# Patient Record
Sex: MALE | Race: WHITE | NOT HISPANIC OR LATINO | Employment: OTHER | URBAN - METROPOLITAN AREA
[De-identification: names, ages, dates, MRNs, and addresses within clinical notes are randomized per-mention and may not be internally consistent; named-entity substitution may affect disease eponyms.]

---

## 2017-03-07 ENCOUNTER — OFFICE VISIT (OUTPATIENT)
Dept: OPHTHALMOLOGY | Facility: CLINIC | Age: 72
End: 2017-03-07
Payer: MEDICARE

## 2017-03-07 DIAGNOSIS — H25.12 NS (NUCLEAR SCLEROSIS), LEFT: ICD-10-CM

## 2017-03-07 DIAGNOSIS — H40.10X3 ADVANCED OPEN-ANGLE GLAUCOMA, SEVERE STAGE: Primary | ICD-10-CM

## 2017-03-07 DIAGNOSIS — H25.11 NS (NUCLEAR SCLEROSIS), RIGHT: ICD-10-CM

## 2017-03-07 PROCEDURE — 92012 INTRM OPH EXAM EST PATIENT: CPT | Mod: S$GLB,,, | Performed by: OPHTHALMOLOGY

## 2017-03-07 PROCEDURE — 99999 PR PBB SHADOW E&M-EST. PATIENT-LVL I: CPT | Mod: PBBFAC,,, | Performed by: OPHTHALMOLOGY

## 2017-03-07 NOTE — PROGRESS NOTES
HPI     Stroke 6/2/2016  +GLAUCOMA X MANY YEARS  MINISHUNT OS BY [employer]GM  +DM X 13 YEARS  CATS OU              BRIMONIDINE TID OU  DORZ-ROSI BID OU  LATANOPROST QHS OU  Desires to have cataract removed - trouble reading.        Last edited by Saurav Kauffman MD on 3/7/2017  8:20 AM.         Assessment /Plan     For exam results, see Encounter Report.      ICD-10-CM ICD-9-CM    1. Advanced open-angle glaucoma, severe stage H40.10X3 365.10 iop controlled at present     365.73    2. NS (nuclear sclerosis), left H25.12 366.16 Desires phaco   3. NS (nuclear sclerosis), right H25.11 366.16        BRIMONIDINE TID OU  DORZ-ROSI BID OU  LATANOPROST QHS OU  Desires to have cataract removed - trouble reading.   Return to clinic 3 months for dilation, Ascan, Sdp  Plan for Phaco/KDB OS

## 2017-05-17 RX ORDER — PEN NEEDLE, DIABETIC 31 GX5/16"
NEEDLE, DISPOSABLE MISCELLANEOUS
Qty: 100 EACH | Refills: 3 | Status: SHIPPED | OUTPATIENT
Start: 2017-05-17 | End: 2018-11-01 | Stop reason: SDUPTHER

## 2017-05-24 ENCOUNTER — OFFICE VISIT (OUTPATIENT)
Dept: OPHTHALMOLOGY | Facility: CLINIC | Age: 72
End: 2017-05-24
Payer: MEDICARE

## 2017-05-24 DIAGNOSIS — H40.1133 PRIMARY OPEN ANGLE GLAUCOMA OF BOTH EYES, SEVERE STAGE: Primary | ICD-10-CM

## 2017-05-24 DIAGNOSIS — H25.12 NS (NUCLEAR SCLEROSIS), LEFT: ICD-10-CM

## 2017-05-24 DIAGNOSIS — H25.11 NS (NUCLEAR SCLEROSIS), RIGHT: ICD-10-CM

## 2017-05-24 PROCEDURE — 92014 COMPRE OPH EXAM EST PT 1/>: CPT | Mod: S$GLB,,, | Performed by: OPHTHALMOLOGY

## 2017-05-24 PROCEDURE — 99999 PR PBB SHADOW E&M-EST. PATIENT-LVL I: CPT | Mod: PBBFAC,,, | Performed by: OPHTHALMOLOGY

## 2017-05-24 NOTE — PROGRESS NOTES
HPI     Glaucoma    Additional comments: BRIM TID OU, COSOPT BID OU  LATANOPROST QHS OU           Comments   Patient is here for a 3 month dilation with cataract check, patient denies   any vision changes since last visit. Left eye has trouble with reading.     Stroke 6/2/2016  +GLAUCOMA X MANY YEARS  MINISHUNT OS BY MGM  +DM X 13 YEARS  CATS OU      OU BRIMONIDINE TID ,DORZ-ROSI BID , LATANOPROST QHS            Last edited by Saurav Kauffman MD on 5/24/2017  4:06 PM. (History)            Assessment /Plan     For exam results, see Encounter Report.      ICD-10-CM ICD-9-CM    1. Primary open angle glaucoma of both eyes, severe stage H40.1133 365.11 iop controlled at present.      365.73    2. NS (nuclear sclerosis), left H25.12 366.16 Very eymptomatic and needs surgery. Patient desires to wait until July.   Cataract od developing as well.    3. Uncontrolled type 2 diabetes mellitus with stage 3 chronic kidney disease, with long-term current use of insulin E11.22 250.52 Diabetes with no diabetic retinopathy on dilated exam.   Reviewed diabetic eye precautions including excellent blood sugar control, and importance of regular follow up.       E11.65 585.3     N18.3 V58.67     Z79.4         Continue BRIM TID OU, COSOPT BID OU  LATANOPROST QHS OU   Return to clinic for cataract preop OS. Would prefer to block.  Med and Ascan not sent today.

## 2017-06-05 ENCOUNTER — TELEPHONE (OUTPATIENT)
Dept: OPHTHALMOLOGY | Facility: CLINIC | Age: 72
End: 2017-06-05

## 2017-06-05 RX ORDER — LATANOPROST 50 UG/ML
1 SOLUTION/ DROPS OPHTHALMIC NIGHTLY
Qty: 1 BOTTLE | Refills: 0 | Status: SHIPPED | OUTPATIENT
Start: 2017-06-05 | End: 2018-04-30 | Stop reason: SDUPTHER

## 2017-06-05 NOTE — TELEPHONE ENCOUNTER
----- Message from Shin Golden sent at 6/5/2017  1:18 PM CDT -----  Pt is requesting a call from nurse to f/u on a prescription for eye drops.          Please call pt back at [phone]

## 2017-06-05 NOTE — TELEPHONE ENCOUNTER
----- Message from Pat Adryan sent at 6/5/2017 11:42 AM CDT -----  Contact: pt  Pt calling to speak to nurse...states that he left his eye drops that have to be refrigerated at home and [place]....wants to know if can get 1 bottle of drops rx sent to pharmacy where he is currently located..    [facility] in [place], [place]    States that sent previous message for rx request but gave the wrong pharmacy...please adv/call pt back at [phone]//thx jw

## 2017-07-18 ENCOUNTER — OFFICE VISIT (OUTPATIENT)
Dept: OPHTHALMOLOGY | Facility: CLINIC | Age: 72
End: 2017-07-18
Payer: MEDICARE

## 2017-07-18 DIAGNOSIS — H40.1133 PRIMARY OPEN ANGLE GLAUCOMA OF BOTH EYES, SEVERE STAGE: ICD-10-CM

## 2017-07-18 DIAGNOSIS — H25.042 POSTERIOR SUBCAPSULAR POLAR SENILE CATARACT, LEFT: Primary | ICD-10-CM

## 2017-07-18 DIAGNOSIS — H25.11 NS (NUCLEAR SCLEROSIS), RIGHT: ICD-10-CM

## 2017-07-18 PROCEDURE — 92012 INTRM OPH EXAM EST PATIENT: CPT | Mod: S$GLB,,, | Performed by: OPHTHALMOLOGY

## 2017-07-18 PROCEDURE — 99999 PR PBB SHADOW E&M-EST. PATIENT-LVL II: CPT | Mod: PBBFAC,,, | Performed by: OPHTHALMOLOGY

## 2017-07-18 PROCEDURE — 92136 OPHTHALMIC BIOMETRY: CPT | Mod: LT,S$GLB,, | Performed by: OPHTHALMOLOGY

## 2017-07-18 RX ORDER — AMLODIPINE BESYLATE 5 MG/1
5 TABLET ORAL DAILY
COMMUNITY

## 2017-07-18 RX ORDER — DIFLUPREDNATE OPHTHALMIC 0.5 MG/ML
1 EMULSION OPHTHALMIC 4 TIMES DAILY
Qty: 1 BOTTLE | Refills: 0 | Status: SHIPPED | OUTPATIENT
Start: 2017-07-18 | End: 2017-08-21 | Stop reason: SDUPTHER

## 2017-07-18 RX ORDER — POLYMYXIN B SULFATE AND TRIMETHOPRIM 1; 10000 MG/ML; [USP'U]/ML
1 SOLUTION OPHTHALMIC 4 TIMES DAILY
Qty: 1 BOTTLE | Refills: 1 | Status: SHIPPED | OUTPATIENT
Start: 2017-07-18 | End: 2017-07-28

## 2017-07-18 NOTE — PROGRESS NOTES
HPI     Patient is here for preop Phaco os , wants surgery as son as possible,   patient takes a 81 mg. Aspirin daily.      Stroke 6/2/2016  +GLAUCOMA X MANY YEARS  MINISHUNT OS BY MGM  +DM X 13 YEARS  CATS OU      OU BRIMONIDINE TID ,DORZ-ROSI BID , LATANOPROST QHS              Last edited by CLAIRE Hackett on 7/18/2017  9:43 AM. (History)            Assessment /Plan     For exam results, see Encounter Report.      ICD-10-CM ICD-9-CM    1. Posterior subcapsular polar senile cataract, left H25.042 366.14 Ambulatory Referral to External Surgery      IOL Master - OS - Left Eye      polymyxin B sulf-trimethoprim (POLYTRIM) 10,000 unit- 1 mg/mL Drop      nepafenac (ILEVRO) 0.3 % DrpS      difluprednate (DUREZOL) 0.05 % Drop ophthalmic solution    Visually Significant Cataract OS > OD  Patient reports decreased vision consistent with the clinical amount of lenticular opacity, which reaches the level of visual significance and affects activities of daily living including reading and glare. Risks, benefits, and alternatives to cataract surgery were discussed.   The pt expressed a desire to proceed with surgery with the potential for some reasonable degree of visual improvement.    Discussed IOL options and refractive outcomes for this patient.    Phaco left eye,   Block  monofocal IOL.  Will aim for distance  Referral to [facility] for [facility]hthalmic surgery  Prescriptions sent for preoperative medications  Durezol, Polytrim, and Ilevro  Explained that patient may need glasses after surgery.  Discussed that vision may be limited by diabetes and glaucoma      18.5. Stop aspirin          2. Primary open angle glaucoma of both eyes, severe stage H40.1133 365.11 Doing well - intraocular pressure is within acceptable range relative to target pressure with no evidence of progression.   Continue current treatment.  Reviewed importance of continued compliance with treatment and follow up.        365.73    3. NS  (nuclear sclerosis), right H25.11 366.16        Return to clinic for cataract surgery OS

## 2017-07-28 ENCOUNTER — OFFICE VISIT (OUTPATIENT)
Dept: OPHTHALMOLOGY | Facility: CLINIC | Age: 72
End: 2017-07-28
Payer: MEDICARE

## 2017-07-28 DIAGNOSIS — Z98.890 POST-OPERATIVE STATE: Primary | ICD-10-CM

## 2017-07-28 DIAGNOSIS — H40.1133 PRIMARY OPEN ANGLE GLAUCOMA OF BOTH EYES, SEVERE STAGE: ICD-10-CM

## 2017-07-28 DIAGNOSIS — Z98.42 CATARACT EXTRACTION STATUS, LEFT: ICD-10-CM

## 2017-07-28 PROCEDURE — 99024 POSTOP FOLLOW-UP VISIT: CPT | Mod: S$GLB,,, | Performed by: OPHTHALMOLOGY

## 2017-07-28 PROCEDURE — 99999 PR PBB SHADOW E&M-EST. PATIENT-LVL I: CPT | Mod: PBBFAC,,, | Performed by: OPHTHALMOLOGY

## 2017-07-28 NOTE — PROGRESS NOTES
HPI     Post-op Evaluation    Additional comments: 1 day phaco os. 2 on pain scale today. doing drops   as directed           Glaucoma    Additional comments: brimonidine tid ou, cosopt bid ou, latanoprost qhs   ou           Comments   Stroke 6/2/2016  +GLAUCOMA X MANY YEARS  MINISHUNT OS BY MGM  +DM X 13 YEARS  CATS OD  PCIOL OS      OU BRIMONIDINE TID ,DORZ-ROSI BID , LATANOPROST QHS  Durezol qid os, polytrim qid os, ilevro qd os       Last edited by Pat Crooks MA on 7/28/2017  9:04 AM. (History)            Assessment /Plan     For exam results, see Encounter Report.      ICD-10-CM ICD-9-CM    1. Post-operative state Z98.890 V45.89    2. Cataract extraction status, left Z98.42 V45.61    3. Primary open angle glaucoma of both eyes, severe stage H40.1133 365.11      365.73        PO Day 1 S/P Phaco/IOL  leftt eye  Doing well.    Use Durezol QID  Ilevro daily  Polymyxin B 4 x day  Reinstructed in importance of absolute compliance with Post-OP instructions including medications, shield at bedtime, and limitation of activities. Follow up appointments in approximately one and six weeks or call immediately for increased pain, redness or vision loss.     Resume coag meds to surgical eye   OU BRIMONIDINE TID ,DORZ-ROSI BID , LATANOPROST QHS

## 2017-07-31 ENCOUNTER — OFFICE VISIT (OUTPATIENT)
Dept: OPHTHALMOLOGY | Facility: CLINIC | Age: 72
End: 2017-07-31
Payer: MEDICARE

## 2017-07-31 DIAGNOSIS — H25.11 NS (NUCLEAR SCLEROSIS), RIGHT: ICD-10-CM

## 2017-07-31 DIAGNOSIS — H40.1133 PRIMARY OPEN ANGLE GLAUCOMA OF BOTH EYES, SEVERE STAGE: ICD-10-CM

## 2017-07-31 DIAGNOSIS — Z98.890 POST-OPERATIVE STATE: Primary | ICD-10-CM

## 2017-07-31 DIAGNOSIS — Z98.42 CATARACT EXTRACTION STATUS, LEFT: ICD-10-CM

## 2017-07-31 PROCEDURE — 99024 POSTOP FOLLOW-UP VISIT: CPT | Mod: S$GLB,,, | Performed by: OPHTHALMOLOGY

## 2017-07-31 PROCEDURE — 99999 PR PBB SHADOW E&M-EST. PATIENT-LVL II: CPT | Mod: PBBFAC,,, | Performed by: OPHTHALMOLOGY

## 2017-07-31 PROCEDURE — 92136 OPHTHALMIC BIOMETRY: CPT | Mod: 26,RT,S$GLB, | Performed by: OPHTHALMOLOGY

## 2017-07-31 RX ORDER — POLYMYXIN B SULFATE AND TRIMETHOPRIM 1; 10000 MG/ML; [USP'U]/ML
1 SOLUTION OPHTHALMIC EVERY 4 HOURS
Qty: 1 BOTTLE | Refills: 1 | Status: CANCELLED | OUTPATIENT
Start: 2017-07-31 | End: 2017-08-10

## 2017-07-31 RX ORDER — DIFLUPREDNATE OPHTHALMIC 0.5 MG/ML
1 EMULSION OPHTHALMIC 4 TIMES DAILY
Qty: 1 BOTTLE | Refills: 1 | Status: CANCELLED | OUTPATIENT
Start: 2017-07-31 | End: 2017-08-30

## 2017-07-31 NOTE — PROGRESS NOTES
HPI     Post-op Evaluation    Additional comments: OS; Polytrim, Durezol QID, Ilvero QD           Glaucoma    Additional comments: OU: Cosopt BID, Brimodine TID, Latanoprost QHS           Comments   5 day PO PCIOL OS  Scratchy, no pain  VA improving OS, VA stable OD    Stroke 6/2/2016  +GLAUCOMA X MANY YEARS  MINISHUNT OS BY MGM  +DM X 13 YEARS  CATS OD  PCIOL OS +18.5 SN60WF / CDE: 17.58 / 7/27/2017      OU BRIMONIDINE TID ,DORZ-ROSI BID , LATANOPROST QHS  Durezol qid os, polytrim qid os, ilevro qd os       Last edited by Juju Moran on 7/31/2017  9:15 AM. (History)            Assessment /Plan     For exam results, see Encounter Report.      ICD-10-CM ICD-9-CM    1. Post-operative state Z98.890 V45.89 PO Week 1 S/P Phaco/ IOL left eye:   Doing well with no evidence of infection or abnormal inflammation.     D/C antibiotic drops  Taper Durezol weekly per instruction sheet.  Ilevro daily 7 more days.  Resume normal activitites and d/c eye shield.  OTC reading glasses can be used until evaluated for final MR.  D/c Shield at night       2. Cataract extraction status, left Z98.42 V45.61    3. NS (nuclear sclerosis), right H25.11 366.16 Patient reports decreased vision in the fellow eye consistent with the clinical amount of lenticular opacity, which reaches the level of visual significance and affects activities of daily living including reading and glare. Risks, benefits, and alternatives to cataract surgery were discussed and pt desired to schedule cataract surgery. Pt was consented and the biometry and lens options were reviewed.    Phaco right for 8/10   Pt wants early as possible arrival   Pt will Stop ASA today   Block   Requests a monofocal  IOL.  Will aim for distance  Patient given prescriptions for Polytrim, Durezol, and Ilevro  Explained that patient may need glasses after surgery.  Discussed that vision may be limited by dm/ glaucoma   IOL Master - MOD 26 - OD- Right eye      Ambulatory Referral to External  Surgery      nepafenac (ILEVRO) 0.3 % DrpS      polymyxin B sulf-trimethoprim (POLYTRIM) 10,000 unit- 1 mg/mL Drop      difluprednate (DUREZOL) 0.05 % Drop ophthalmic solution   4. Primary open angle glaucoma of both eyes, severe stage H40.1133 365.11      365.73          OU: Cosopt BID, Brimodine TID, Latanoprost QHS

## 2017-08-11 ENCOUNTER — OFFICE VISIT (OUTPATIENT)
Dept: OPHTHALMOLOGY | Facility: CLINIC | Age: 72
End: 2017-08-11
Payer: MEDICARE

## 2017-08-11 DIAGNOSIS — Z98.41 CATARACT EXTRACTION STATUS, RIGHT: ICD-10-CM

## 2017-08-11 DIAGNOSIS — Z98.890 POST-OPERATIVE STATE: Primary | ICD-10-CM

## 2017-08-11 PROCEDURE — 99024 POSTOP FOLLOW-UP VISIT: CPT | Mod: S$GLB,,, | Performed by: OPHTHALMOLOGY

## 2017-08-11 PROCEDURE — 99999 PR PBB SHADOW E&M-EST. PATIENT-LVL I: CPT | Mod: PBBFAC,,, | Performed by: OPHTHALMOLOGY

## 2017-08-11 NOTE — PROGRESS NOTES
HPI     Post-op Evaluation    Additional comments: OD: Durezol QID, Polytrim QID, Ilevro QD           Glaucoma    Additional comments: OU BRIMONIDINE TID ,DORZ-ROSI BID , LATANOPROST QHS           Comments   Pt here for 1 day PCIOL OD PO. Pt states that yesterday, his right eye   experienced severe pain and scratchiness. Today, he says that his eye was   seeing lightning, it was only one episode and a quick light, not happened since . The lightning is not there anymore. It was just for a   small instance. He says that he still feels pain in it, but it is a little   better as he only feels it when he blinks. 99% compliant with gtts.     Stroke 6/2/2016  +GLAUCOMA X MANY YEARS  MINISHUNT OS BY MGM  +DM X 13 YEARS  PCIOL OD 8-10-17  PCIOL OS +18.5 SN60WF / CDE: 17.58 / 7/27/2017      OU BRIMONIDINE TID ,DORZ-ROSI BID , LATANOPROST QHS  Durezol Tid os, Ilevro QD  OD: Durezol QID, Polytrim QID, Ilevro QD       Last edited by Wong Gr, Patient Care Assistant on 8/11/2017  9:15   AM. (History)            Assessment /Plan     For exam results, see Encounter Report.      ICD-10-CM ICD-9-CM    1. Post-operative state Z98.890 V45.89    2. Cataract extraction status, right Z98.41 V45.61        PO Day 1 S/P Phaco/IOL  right eye  Doing well.    Use Durezol QID  Ilevro daily  Polymyxin B 4 x day  Reinstructed in importance of absolute compliance with Post-OP instructions including medications, shield at bedtime, and limitation of activities. Follow up appointments in approximately one and six weeks or call immediately for increased pain, redness or vision loss.           : OU BRIMONIDINE TID ,DORZ-ROSI BID , LATANOPROST QHS

## 2017-08-16 ENCOUNTER — OFFICE VISIT (OUTPATIENT)
Dept: OPHTHALMOLOGY | Facility: CLINIC | Age: 72
End: 2017-08-16
Payer: MEDICARE

## 2017-08-16 DIAGNOSIS — Z98.42 CATARACT EXTRACTION STATUS, LEFT: ICD-10-CM

## 2017-08-16 DIAGNOSIS — Z98.890 POST-OPERATIVE STATE: Primary | ICD-10-CM

## 2017-08-16 DIAGNOSIS — Z98.41 CATARACT EXTRACTION STATUS, RIGHT: ICD-10-CM

## 2017-08-16 PROCEDURE — 99024 POSTOP FOLLOW-UP VISIT: CPT | Mod: S$GLB,,, | Performed by: OPHTHALMOLOGY

## 2017-08-16 PROCEDURE — 99999 PR PBB SHADOW E&M-EST. PATIENT-LVL I: CPT | Mod: PBBFAC,,, | Performed by: OPHTHALMOLOGY

## 2017-08-16 NOTE — PROGRESS NOTES
HPI     Sp PCIOL OD +19.5 SN60WF / CDE: 15:15 / 8-10-17    Stroke 6/2/2016  +GLAUCOMA X MANY YEARS  MINISHUNT OS BY MGM  +DM X 13 YEARS  PCIOL OD +19.5 SN60WF / CDE: 15:15 / 8-10-17  PCIOL OS +18.5 SN60WF / CDE: 17.58 / 7/27/2017      OU BRIMONIDINE TID ,DORZ-ROSI BID , LATANOPROST QHS  Durezol bid os  OD: Durezol QID, Polytrim QID, Ilevro QD  Systane prn    Last edited by Mary Jones on 8/16/2017  2:42 PM. (History)            Assessment /Plan     For exam results, see Encounter Report.      ICD-10-CM ICD-9-CM    1. Post-operative state Z98.890 V45.89    2. Cataract extraction status, right Z98.41 V45.61    3. Cataract extraction status, left Z98.42 V45.61        PO Week 1 S/P Phaco/ IOL right eye:   Doing well with no evidence of infection or abnormal inflammation.     D/C antibiotic drops  Taper Durezol weekly per instruction sheet.  Ilevro daily until out   Resume normal activitites and d/c eye shield.  OTC reading glasses can be used until evaluated for final MR.  D/c Shield at night    RTC 4 weeks or PRN pain, redness, vision loss, or other concerns.       OU BRIMONIDINE TID ,DORZ-ROSI BID , LATANOPROST QHS

## 2017-08-21 DIAGNOSIS — H25.042 POSTERIOR SUBCAPSULAR POLAR SENILE CATARACT, LEFT: ICD-10-CM

## 2017-08-22 RX ORDER — DUREZOL 0.5 MG/ML
EMULSION OPHTHALMIC
Qty: 5 ML | Refills: 1 | Status: SHIPPED | OUTPATIENT
Start: 2017-08-22 | End: 2018-01-24

## 2017-09-13 ENCOUNTER — OFFICE VISIT (OUTPATIENT)
Dept: OPHTHALMOLOGY | Facility: CLINIC | Age: 72
End: 2017-09-13
Payer: MEDICARE

## 2017-09-13 DIAGNOSIS — Z98.890 POST-OPERATIVE STATE: Primary | ICD-10-CM

## 2017-09-13 PROCEDURE — 99024 POSTOP FOLLOW-UP VISIT: CPT | Mod: S$GLB,,, | Performed by: OPHTHALMOLOGY

## 2017-09-13 PROCEDURE — 99999 PR PBB SHADOW E&M-EST. PATIENT-LVL I: CPT | Mod: PBBFAC,,, | Performed by: OPHTHALMOLOGY

## 2017-09-13 NOTE — PROGRESS NOTES
HPI     PCIOL OD +19.5 SN60WF / CDE: 15:15 / 8-10-17,   PCIOL OS +18.5 SN60WF / CDE: 17.58 / 7/27/2017.     No complaints, no eye pain.    Stroke 6/2/2016  +GLAUCOMA X MANY YEARS  MINISHUNT OS BY MGM  +DM X 13 YEARS  PCIOL OD +19.5 SN60WF / CDE: 15:15 / 8-10-17  PCIOL OS +18.5 SN60WF / CDE: 17.58 / 7/27/2017      OU BRIMONIDINE TID ,DORZ-ROSI BID , LATANOPROST QHS    Last edited by Saurav Kauffman MD on 9/13/2017  1:23 PM. (History)            Assessment /Plan     For exam results, see Encounter Report.      ICD-10-CM ICD-9-CM    1. Post-operative state Z98.890 V45.89        Uveitis present OD- resume Durezol QD OU  until next visit     RETURN TO CLINIC 4-5 weeks     [facility] wants [facility] to 2% brimonidine- that is okay but TID     OU BRIMONIDINE TID ,DORZ-ROSI BID , LATANOPROST QHS

## 2017-10-18 ENCOUNTER — OFFICE VISIT (OUTPATIENT)
Dept: OPHTHALMOLOGY | Facility: CLINIC | Age: 72
End: 2017-10-18
Payer: MEDICARE

## 2017-10-18 DIAGNOSIS — Z98.42 CATARACT EXTRACTION STATUS, LEFT: ICD-10-CM

## 2017-10-18 DIAGNOSIS — Z98.41 CATARACT EXTRACTION STATUS, RIGHT: ICD-10-CM

## 2017-10-18 DIAGNOSIS — H40.1133 PRIMARY OPEN ANGLE GLAUCOMA OF BOTH EYES, SEVERE STAGE: Primary | ICD-10-CM

## 2017-10-18 PROCEDURE — 99024 POSTOP FOLLOW-UP VISIT: CPT | Mod: S$GLB,,, | Performed by: OPHTHALMOLOGY

## 2017-10-18 PROCEDURE — 99999 PR PBB SHADOW E&M-EST. PATIENT-LVL I: CPT | Mod: PBBFAC,,, | Performed by: OPHTHALMOLOGY

## 2017-10-18 NOTE — PROGRESS NOTES
HPI     Patient returns for a one month post op visit., patient states he ran out   of Durezol, 3 weeks ago.        Stroke 6/2/2016  +GLAUCOMA X MANY YEARS  MINISHUNT OS BY MGM  +DM X 13 YEARS  PCIOL OD +19.5 SN60WF / CDE: 15:15 / 8-10-17  PCIOL OS +18.5 SN60WF / CDE: 17.58 / 7/27/2017    OD DUREZOL ONCE DAILY (RAN OUT 3 WEEKS AGO)  OU BRIMONIDINE TID ,DORZ-ROSI BID , LATANOPROST QHS    Last edited by CLAIRE Hackett on 10/18/2017  2:13 PM. (History)            Assessment /Plan     For exam results, see Encounter Report.      ICD-10-CM ICD-9-CM    1. Primary open angle glaucoma of both eyes, severe stage H40.1133 365.11 iop stable- Follow      365.73    2. Cataract extraction status, left Z98.42 V45.61 S/p phaco- doing well    3. Cataract extraction status, right Z98.41 V45.61 S/p phaco- doing well        RETURN TO CLINIC 3-4 month HVF, SDP and DOA     OU BRIMONIDINE TID ,DORZ-ROSI BID , LATANOPROST QHS

## 2018-01-24 ENCOUNTER — OFFICE VISIT (OUTPATIENT)
Dept: OPHTHALMOLOGY | Facility: CLINIC | Age: 73
End: 2018-01-24
Payer: MEDICARE

## 2018-01-24 DIAGNOSIS — Z98.42 CATARACT EXTRACTION STATUS, LEFT: ICD-10-CM

## 2018-01-24 DIAGNOSIS — Z98.41 CATARACT EXTRACTION STATUS, RIGHT: ICD-10-CM

## 2018-01-24 DIAGNOSIS — H40.1133 PRIMARY OPEN ANGLE GLAUCOMA OF BOTH EYES, SEVERE STAGE: Primary | ICD-10-CM

## 2018-01-24 PROCEDURE — 92014 COMPRE OPH EXAM EST PT 1/>: CPT | Mod: S$GLB,,, | Performed by: OPHTHALMOLOGY

## 2018-01-24 PROCEDURE — 92083 EXTENDED VISUAL FIELD XM: CPT | Mod: S$GLB,,, | Performed by: OPHTHALMOLOGY

## 2018-01-24 PROCEDURE — 99999 PR PBB SHADOW E&M-EST. PATIENT-LVL I: CPT | Mod: PBBFAC,,, | Performed by: OPHTHALMOLOGY

## 2018-01-24 PROCEDURE — 92250 FUNDUS PHOTOGRAPHY W/I&R: CPT | Mod: S$GLB,,, | Performed by: OPHTHALMOLOGY

## 2018-01-24 NOTE — PROGRESS NOTES
HPI     Here for IOP check, HVF review and FD with SDPs.  No changes in vision   per pt.    Stroke 6/2/2016  +GLAUCOMA X MANY YEARS  MINISHUNT OS BY MGM  +DM X 13 YEARS  PCIOL OD +19.5 SN60WF / CDE: 15:15 / 8-10-17  PCIOL OS +18.5 SN60WF / CDE: 17.58 / 7/27/2017    OU BRIMONIDINE TID ,DORZ-ROSI BID , LATANOPROST QHS    Last edited by Mary Jones on 1/24/2018  2:59 PM. (History)            Assessment /Plan     For exam results, see Encounter Report.      ICD-10-CM ICD-9-CM    1. Primary open angle glaucoma of both eyes, severe stage H40.1133 365.11 Doing well - intraocular pressure is within acceptable range relative to target pressure with no evidence of progression.   Continue current treatment.  Reviewed importance of continued compliance with treatment and follow up.     Color Fundus Photography - OU - Both Eyes     365.73 [place] Visual Field - OU - Extended - Both Eyes   2. Uncontrolled type 2 diabetes mellitus with stage 3 chronic kidney disease, with long-term current use of insulin E11.22 250.52 Diabetes with no diabetic retinopathy on dilated exam.   Reviewed diabetic eye precautions including excellent blood sugar control, and importance of regular follow up.       E11.65 585.3     N18.3 V58.67     Z79.4     3. Cataract extraction status, left Z98.42 V45.61    4. Cataract extraction status, right Z98.41 V45.61        OU BRIMONIDINE TID ,DORZ-ROSI BID , LATANOPROST QHS   Return to clinic 4 months for iop

## 2018-04-30 ENCOUNTER — TELEPHONE (OUTPATIENT)
Dept: OPHTHALMOLOGY | Facility: CLINIC | Age: 73
End: 2018-04-30

## 2018-04-30 RX ORDER — LATANOPROST 50 UG/ML
1 SOLUTION/ DROPS OPHTHALMIC NIGHTLY
Qty: 1 BOTTLE | Refills: 6 | Status: SHIPPED | OUTPATIENT
Start: 2018-04-30 | End: 2018-05-29 | Stop reason: SDUPTHER

## 2018-04-30 RX ORDER — BRIMONIDINE TARTRATE 1 MG/ML
1 SOLUTION/ DROPS OPHTHALMIC 3 TIMES DAILY
Qty: 10 ML | Refills: 6 | Status: SHIPPED | OUTPATIENT
Start: 2018-04-30 | End: 2018-05-29 | Stop reason: SDUPTHER

## 2018-04-30 RX ORDER — DORZOLAMIDE HYDROCHLORIDE AND TIMOLOL MALEATE 20; 5 MG/ML; MG/ML
1 SOLUTION/ DROPS OPHTHALMIC 2 TIMES DAILY
Qty: 10 ML | Refills: 6 | Status: SHIPPED | OUTPATIENT
Start: 2018-04-30 | End: 2018-05-01 | Stop reason: SDUPTHER

## 2018-04-30 NOTE — TELEPHONE ENCOUNTER
----- Message from Kiera Zuleta sent at 4/30/2018 10:59 AM CDT -----  Contact: pt  The pt states he needs a written prescription on his eye drop meds, the pt wants to come in today to  prescription, the pt can be reached at [phone]///thxMW

## 2018-05-01 RX ORDER — DORZOLAMIDE HYDROCHLORIDE AND TIMOLOL MALEATE 20; 5 MG/ML; MG/ML
1 SOLUTION/ DROPS OPHTHALMIC 2 TIMES DAILY
Qty: 10 ML | Refills: 6 | Status: SHIPPED | OUTPATIENT
Start: 2018-05-01 | End: 2018-10-08 | Stop reason: SDUPTHER

## 2018-05-01 NOTE — TELEPHONE ENCOUNTER
Spoke with patients wife. Yesterday there was confusion [facility], [facility] being [facility] to [facility]. I let her know I am not sure what happened yesterday, but advised her that cosopt is on backorder and may not be [facility]. Pt's wife requested for me to send rx to [facility] so that he can get medication today.

## 2018-05-29 ENCOUNTER — OFFICE VISIT (OUTPATIENT)
Dept: OPHTHALMOLOGY | Facility: CLINIC | Age: 73
End: 2018-05-29
Payer: MEDICARE

## 2018-05-29 DIAGNOSIS — Z98.42 CATARACT EXTRACTION STATUS, LEFT: ICD-10-CM

## 2018-05-29 DIAGNOSIS — H40.1133 PRIMARY OPEN ANGLE GLAUCOMA OF BOTH EYES, SEVERE STAGE: Primary | ICD-10-CM

## 2018-05-29 DIAGNOSIS — Z98.41 CATARACT EXTRACTION STATUS, RIGHT: ICD-10-CM

## 2018-05-29 PROCEDURE — 92133 CPTRZD OPH DX IMG PST SGM ON: CPT | Mod: S$GLB,,, | Performed by: OPHTHALMOLOGY

## 2018-05-29 PROCEDURE — 92012 INTRM OPH EXAM EST PATIENT: CPT | Mod: S$GLB,,, | Performed by: OPHTHALMOLOGY

## 2018-05-29 PROCEDURE — 99999 PR PBB SHADOW E&M-EST. PATIENT-LVL II: CPT | Mod: PBBFAC,,, | Performed by: OPHTHALMOLOGY

## 2018-05-29 RX ORDER — BRIMONIDINE TARTRATE 1 MG/ML
1 SOLUTION/ DROPS OPHTHALMIC 3 TIMES DAILY
Qty: 10 ML | Refills: 6 | Status: SHIPPED | OUTPATIENT
Start: 2018-05-29 | End: 2018-10-08 | Stop reason: SDUPTHER

## 2018-05-29 RX ORDER — DORZOLAMIDE HCL 20 MG/ML
1 SOLUTION/ DROPS OPHTHALMIC 2 TIMES DAILY
Qty: 10 ML | Refills: 6 | Status: SHIPPED | OUTPATIENT
Start: 2018-05-29 | End: 2019-03-19

## 2018-05-29 RX ORDER — LATANOPROST 50 UG/ML
1 SOLUTION/ DROPS OPHTHALMIC NIGHTLY
Qty: 1 BOTTLE | Refills: 6 | Status: SHIPPED | OUTPATIENT
Start: 2018-05-29 | End: 2018-10-08 | Stop reason: SDUPTHER

## 2018-05-29 RX ORDER — TIMOLOL MALEATE 5 MG/ML
SOLUTION/ DROPS OPHTHALMIC
Qty: 5 ML | Refills: 6 | Status: SHIPPED | OUTPATIENT
Start: 2018-05-29 | End: 2018-11-15 | Stop reason: SDUPTHER

## 2018-05-29 RX ORDER — TIMOLOL MALEATE 5 MG/ML
SOLUTION/ DROPS OPHTHALMIC
Refills: 6 | COMMUNITY
Start: 2018-05-01 | End: 2018-05-29 | Stop reason: SDUPTHER

## 2018-05-29 RX ORDER — DORZOLAMIDE HCL 20 MG/ML
SOLUTION/ DROPS OPHTHALMIC
Refills: 6 | COMMUNITY
Start: 2018-05-01 | End: 2018-05-29 | Stop reason: SDUPTHER

## 2018-05-29 NOTE — PROGRESS NOTES
HPI     Glaucoma    Additional comments: 4 month IOP check, OU: Brimonidine TID, Dorzolamide   and Timolol BID, Latanoprost QHS OU           Comments   Pt states he's been forgetting to use his drops, he put them in last night   but did not put any this morning. He states he was given the two separate   drops for cosopt since it was backordered. Will need a refill on all his   dro[facility].    Stroke 6/2/2016  +GLAUCOMA X MANY YEARS  MINISHUNT OS BY [employer]GM  +DM X 13 YEARS  PCIOL OD +19.5 SN60WF / CDE: 15:15 / 8-10-17  PCIOL OS +18.5 SN60WF / CDE: 17.58 / 7/27/2017    OU BRIMONIDINE TID ,DORZ-ROSI BID , LATANOPROST QHS       Last edited by Vishnu Chahal on 5/29/2018 11:14 AM. (History)            Assessment /Plan     For exam results, see Encounter Report.      ICD-10-CM ICD-9-CM    1. Primary open angle glaucoma of both eyes, severe stage H40.1133 365.11 latanoprost 0.005 % ophthalmic solution     365.73 brimonidine 0.1% (ALPHAGAN P) 0.1 % Drop      timolol maleate 0.5% (TIMOPTIC) 0.5 % Drop      dorzolamide (TRUSOPT) 2 % ophthalmic solution    Doing well - intraocular pressure is within acceptable range relative to target pressure with no evidence of progression.   Continue current treatment.  Reviewed importance of continued compliance with treatment and follow up.      2. Uncontrolled type 2 diabetes mellitus with stage 3 chronic kidney disease, with long-term current use of insulin E11.22 250.52 Dilated in January     E11.65 585.3     N18.3 V58.67     Z79.4     3. Cataract extraction status, left Z98.42 V45.61 Stable    4. Cataract extraction status, right Z98.41 V45.61 stable     OU BRIMONIDINE TID ,DORZ-ROSI BID , LATANOPROST QHS  Return to clinic 3 months with IOP check

## 2018-08-28 ENCOUNTER — OFFICE VISIT (OUTPATIENT)
Dept: OPHTHALMOLOGY | Facility: CLINIC | Age: 73
End: 2018-08-28
Payer: MEDICARE

## 2018-08-28 DIAGNOSIS — Z98.41 CATARACT EXTRACTION STATUS, RIGHT: ICD-10-CM

## 2018-08-28 DIAGNOSIS — Z98.42 CATARACT EXTRACTION STATUS, LEFT: ICD-10-CM

## 2018-08-28 DIAGNOSIS — H40.1133 PRIMARY OPEN ANGLE GLAUCOMA OF BOTH EYES, SEVERE STAGE: Primary | ICD-10-CM

## 2018-08-28 PROCEDURE — 99999 PR PBB SHADOW E&M-EST. PATIENT-LVL II: CPT | Mod: PBBFAC,,, | Performed by: OPHTHALMOLOGY

## 2018-08-28 PROCEDURE — 92012 INTRM OPH EXAM EST PATIENT: CPT | Mod: S$GLB,,, | Performed by: OPHTHALMOLOGY

## 2018-08-28 RX ORDER — MULTIVITAMIN
1 TABLET ORAL
COMMUNITY

## 2018-08-28 NOTE — PROGRESS NOTES
HPI     Glaucoma      Additional comments: 3 month iop check              Comments     Stroke 6/2/2016  +GLAUCOMA X MANY YEARS  MINISHUNT OS BY MGM  +DM X 13 YEARS  PCIOL OD +19.5 SN60WF / CDE: 15:15 / 8-10-17  PCIOL OS +18.5 SN60WF / CDE: 17.58 / 7/27/2017    OU BRIMONIDINE TID ,DORZOLAMIDE and TIMOLOL BID , LATANOPROST QHS          Last edited by Betzy Quesada on 8/28/2018 11:09 AM. (History)            Assessment /Plan     For exam results, see Encounter Report.      ICD-10-CM ICD-9-CM    1. Primary open angle glaucoma of both eyes, severe stage H40.1133 365.11 Doing well - intraocular pressure is within acceptable range relative to target pressure with no evidence of progression.   Continue current treatment.  Reviewed importance of continued compliance with treatment and follow up.        365.73    2. Cataract extraction status, left Z98.42 V45.61    3. Cataract extraction status, right Z98.41 V45.61        OU BRIMONIDINE TID ,TIMOLOL BID , LATANOPROST QHS   PT STOPPED DORZOLAMIDE HE THINKS- OK TO FOLLOW WITHOUT AT THIS TIME- [facility]     Return to clinic 4 MONTH BRANDON ARELLANO

## 2018-10-08 DIAGNOSIS — H40.1133 PRIMARY OPEN ANGLE GLAUCOMA OF BOTH EYES, SEVERE STAGE: ICD-10-CM

## 2018-10-08 RX ORDER — DORZOLAMIDE HYDROCHLORIDE AND TIMOLOL MALEATE 20; 5 MG/ML; MG/ML
1 SOLUTION/ DROPS OPHTHALMIC 2 TIMES DAILY
Qty: 10 ML | Refills: 6 | Status: SHIPPED | OUTPATIENT
Start: 2018-10-08 | End: 2018-11-14 | Stop reason: SDUPTHER

## 2018-10-08 RX ORDER — LATANOPROST 50 UG/ML
1 SOLUTION/ DROPS OPHTHALMIC NIGHTLY
Qty: 3 BOTTLE | Refills: 6 | Status: SHIPPED | OUTPATIENT
Start: 2018-10-08 | End: 2018-11-14 | Stop reason: SDUPTHER

## 2018-10-08 RX ORDER — BRIMONIDINE TARTRATE 1 MG/ML
1 SOLUTION/ DROPS OPHTHALMIC 3 TIMES DAILY
Qty: 10 ML | Refills: 6 | Status: SHIPPED | OUTPATIENT
Start: 2018-10-08 | End: 2018-11-14 | Stop reason: SDUPTHER

## 2018-11-02 RX ORDER — PEN NEEDLE, DIABETIC 31 GX5/16"
NEEDLE, DISPOSABLE MISCELLANEOUS
Qty: 100 EACH | Refills: 3 | Status: SHIPPED | OUTPATIENT
Start: 2018-11-02

## 2018-11-14 DIAGNOSIS — H40.1133 PRIMARY OPEN ANGLE GLAUCOMA OF BOTH EYES, SEVERE STAGE: ICD-10-CM

## 2018-11-14 RX ORDER — DORZOLAMIDE HYDROCHLORIDE AND TIMOLOL MALEATE 20; 5 MG/ML; MG/ML
1 SOLUTION/ DROPS OPHTHALMIC 2 TIMES DAILY
Qty: 10 ML | Refills: 6 | Status: SHIPPED | OUTPATIENT
Start: 2018-11-14 | End: 2019-03-19 | Stop reason: SDUPTHER

## 2018-11-14 RX ORDER — BRIMONIDINE TARTRATE 1 MG/ML
1 SOLUTION/ DROPS OPHTHALMIC 3 TIMES DAILY
Qty: 10 ML | Refills: 6 | Status: SHIPPED | OUTPATIENT
Start: 2018-11-14 | End: 2018-11-15 | Stop reason: SDUPTHER

## 2018-11-14 RX ORDER — LATANOPROST 50 UG/ML
1 SOLUTION/ DROPS OPHTHALMIC NIGHTLY
Qty: 3 BOTTLE | Refills: 6 | Status: SHIPPED | OUTPATIENT
Start: 2018-11-14 | End: 2018-11-15 | Stop reason: SDUPTHER

## 2018-11-14 NOTE — TELEPHONE ENCOUNTER
Phoned pt wife to inform her that Rxs were sent to Dr. Kauffman.  He will sign them and send them to [facility] electronically.

## 2018-11-15 DIAGNOSIS — H40.1133 PRIMARY OPEN ANGLE GLAUCOMA OF BOTH EYES, SEVERE STAGE: ICD-10-CM

## 2018-11-15 RX ORDER — LATANOPROST 50 UG/ML
1 SOLUTION/ DROPS OPHTHALMIC NIGHTLY
Qty: 3 BOTTLE | Refills: 6 | Status: SHIPPED | OUTPATIENT
Start: 2018-11-15 | End: 2019-03-19 | Stop reason: SDUPTHER

## 2018-11-15 RX ORDER — BRIMONIDINE TARTRATE 1 MG/ML
1 SOLUTION/ DROPS OPHTHALMIC 3 TIMES DAILY
Qty: 10 ML | Refills: 6 | Status: SHIPPED | OUTPATIENT
Start: 2018-11-15 | End: 2018-12-14 | Stop reason: SDUPTHER

## 2018-11-15 RX ORDER — TIMOLOL MALEATE 5 MG/ML
SOLUTION/ DROPS OPHTHALMIC
Qty: 5 ML | Refills: 6 | Status: SHIPPED | OUTPATIENT
Start: 2018-11-15 | End: 2019-03-19

## 2018-11-15 NOTE — TELEPHONE ENCOUNTER
L/JOEY  On her recorder and apologized for not getting her message , I looked at patient's chart and it stated [facility] I stated we could mail her his rx but I need her to specify which drops he needs.

## 2018-12-05 ENCOUNTER — OFFICE VISIT (OUTPATIENT)
Dept: OPHTHALMOLOGY | Facility: CLINIC | Age: 73
End: 2018-12-05
Payer: MEDICARE

## 2018-12-05 DIAGNOSIS — Z96.1 PSEUDOPHAKIA OF BOTH EYES: ICD-10-CM

## 2018-12-05 DIAGNOSIS — H40.1133 PRIMARY OPEN ANGLE GLAUCOMA OF BOTH EYES, SEVERE STAGE: Primary | ICD-10-CM

## 2018-12-05 DIAGNOSIS — H26.493 PCO (POSTERIOR CAPSULAR OPACIFICATION), BILATERAL: ICD-10-CM

## 2018-12-05 PROCEDURE — 92014 COMPRE OPH EXAM EST PT 1/>: CPT | Mod: S$GLB,,, | Performed by: OPHTHALMOLOGY

## 2018-12-05 PROCEDURE — 99999 PR PBB SHADOW E&M-EST. PATIENT-LVL II: CPT | Mod: PBBFAC,,, | Performed by: OPHTHALMOLOGY

## 2018-12-05 PROCEDURE — 92250 FUNDUS PHOTOGRAPHY W/I&R: CPT | Mod: S$GLB,,, | Performed by: OPHTHALMOLOGY

## 2018-12-05 RX ORDER — BRIMONIDINE TARTRATE 2 MG/ML
1 SOLUTION/ DROPS OPHTHALMIC 2 TIMES DAILY
Qty: 5 ML | Refills: 1 | Status: CANCELLED | OUTPATIENT
Start: 2018-12-05 | End: 2019-12-05

## 2018-12-05 RX ORDER — WARFARIN 1 MG/1
1 TABLET ORAL DAILY
COMMUNITY

## 2018-12-05 RX ORDER — BRIMONIDINE TARTRATE 1 MG/ML
1 SOLUTION/ DROPS OPHTHALMIC 2 TIMES DAILY
Qty: 15 ML | Refills: 6 | Status: SHIPPED | OUTPATIENT
Start: 2018-12-05 | End: 2019-01-04

## 2018-12-05 RX ORDER — TIMOLOL MALEATE 5 MG/ML
1 SOLUTION/ DROPS OPHTHALMIC 2 TIMES DAILY
Qty: 10 ML | Refills: 6 | Status: SHIPPED | OUTPATIENT
Start: 2018-12-05 | End: 2019-03-19 | Stop reason: SDUPTHER

## 2018-12-05 RX ORDER — LATANOPROST 50 UG/ML
1 SOLUTION/ DROPS OPHTHALMIC NIGHTLY
Qty: 1 BOTTLE | Refills: 6 | Status: SHIPPED | OUTPATIENT
Start: 2018-12-05 | End: 2019-03-19 | Stop reason: SDUPTHER

## 2018-12-05 NOTE — PROGRESS NOTES
HPI     Patient returns for a 3 month iop check and dilation, patient states he   is 100% compliant with drop usage, [facility]   and requests that we print the rx's out .    Stroke 6/2/2016  +GLAUCOMA X MANY YEARS  MINISHUNT OS BY MGM  +DM X 13 YEARS  PCIOL OD +19.5 SN60WF / CDE: 15:15 / 8-10-17  PCIOL OS +18.5 SN60WF / CDE: 17.58 / 7/27/2017    OU BRIMONIDINE TID , TIMOLOL BID , LATANOPROST QHS    Last edited by Saurav Kauffman MD on 12/5/2018 10:42 AM. (History)            Assessment /Plan     For exam results, see Encounter Report.      ICD-10-CM ICD-9-CM    1. Primary open angle glaucoma of both eyes, severe stage H40.1133 365.11 Color Fundus Photography - OU - Both Eyes     365.73 timolol maleate 0.5% (TIMOPTIC) 0.5 % Drop      latanoprost 0.005 % ophthalmic solution      brimonidine 0.1% (ALPHAGAN P) 0.1 % Drop    Doing well - intraocular pressure is within acceptable range relative to target pressure with no evidence of progression.   Continue current treatment.  Reviewed importance of continued compliance with treatment and follow up.      2. Pseudophakia of both eyes Z96.1 V43.1 Stable    3. Uncontrolled type 2 diabetes mellitus with stage 3 chronic kidney disease, with long-term current use of insulin E11.22 250.52 Diabetes with no diabetic retinopathy on dilated exam.   Reviewed diabetic eye precautions including excellent blood sugar control, and importance of regular follow up.           E11.65 585.3     N18.3 V58.67     Z79.4     4. 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 Yag laser treatment as needed if visual loss occurs.                IOP good without trusopt   OU BRIMONIDINE TID , TIMOLOL BID , LATANOPROST QHS  Return to clinic 3 months with IOP check

## 2018-12-13 DIAGNOSIS — H40.1133 PRIMARY OPEN ANGLE GLAUCOMA OF BOTH EYES, SEVERE STAGE: ICD-10-CM

## 2018-12-14 RX ORDER — BRIMONIDINE TARTRATE 1.5 MG/ML
SOLUTION/ DROPS OPHTHALMIC
Qty: 5 ML | Refills: 9 | Status: SHIPPED | OUTPATIENT
Start: 2018-12-14 | End: 2019-03-19

## 2018-12-14 RX ORDER — BRIMONIDINE TARTRATE 1 MG/ML
1 SOLUTION/ DROPS OPHTHALMIC 3 TIMES DAILY
Qty: 10 ML | Refills: 4 | Status: SHIPPED | OUTPATIENT
Start: 2018-12-14 | End: 2019-03-19 | Stop reason: DRUGHIGH

## 2018-12-14 NOTE — TELEPHONE ENCOUNTER
Called pt and discussed with them the changes that need to happen when ordering Brimonidine for a 90 day supply. Pt was able to receive a 90 day supply through the pharmacy.

## 2018-12-14 NOTE — TELEPHONE ENCOUNTER
----- Message from Sharita Zuleta sent at 12/14/2018 12:26 PM CST -----  Contact: pt  Calling to speak with Dr. Kauffman's office in regards to rx for 90 day eye drops. Please call Libia whitfield [phone].

## 2019-03-19 ENCOUNTER — OFFICE VISIT (OUTPATIENT)
Dept: OPHTHALMOLOGY | Facility: CLINIC | Age: 74
End: 2019-03-19
Payer: MEDICARE

## 2019-03-19 DIAGNOSIS — Z96.1 PSEUDOPHAKIA OF BOTH EYES: ICD-10-CM

## 2019-03-19 DIAGNOSIS — H40.1133 PRIMARY OPEN ANGLE GLAUCOMA OF BOTH EYES, SEVERE STAGE: Primary | ICD-10-CM

## 2019-03-19 DIAGNOSIS — H26.493 PCO (POSTERIOR CAPSULAR OPACIFICATION), BILATERAL: ICD-10-CM

## 2019-03-19 PROCEDURE — 99999 PR PBB SHADOW E&M-EST. PATIENT-LVL II: CPT | Mod: PBBFAC,,, | Performed by: OPHTHALMOLOGY

## 2019-03-19 PROCEDURE — 92012 PR EYE EXAM, EST PATIENT,INTERMED: ICD-10-PCS | Mod: S$GLB,,, | Performed by: OPHTHALMOLOGY

## 2019-03-19 PROCEDURE — 92012 INTRM OPH EXAM EST PATIENT: CPT | Mod: S$GLB,,, | Performed by: OPHTHALMOLOGY

## 2019-03-19 PROCEDURE — 99999 PR PBB SHADOW E&M-EST. PATIENT-LVL II: ICD-10-PCS | Mod: PBBFAC,,, | Performed by: OPHTHALMOLOGY

## 2019-03-19 RX ORDER — DORZOLAMIDE HYDROCHLORIDE AND TIMOLOL MALEATE 20; 5 MG/ML; MG/ML
1 SOLUTION/ DROPS OPHTHALMIC 2 TIMES DAILY
Qty: 10 ML | Refills: 6 | Status: SHIPPED | OUTPATIENT
Start: 2019-03-19 | End: 2019-03-19 | Stop reason: SDUPTHER

## 2019-03-19 RX ORDER — DORZOLAMIDE HYDROCHLORIDE AND TIMOLOL MALEATE 20; 5 MG/ML; MG/ML
1 SOLUTION/ DROPS OPHTHALMIC 2 TIMES DAILY
Qty: 10 ML | Refills: 6 | Status: SHIPPED | OUTPATIENT
Start: 2019-03-19 | End: 2019-08-28 | Stop reason: SDUPTHER

## 2019-03-19 RX ORDER — BRIMONIDINE TARTRATE 2 MG/ML
1 SOLUTION/ DROPS OPHTHALMIC EVERY 8 HOURS
Qty: 10 ML | Refills: 6 | Status: SHIPPED | OUTPATIENT
Start: 2019-03-19 | End: 2019-03-19 | Stop reason: SDUPTHER

## 2019-03-19 RX ORDER — LATANOPROST 50 UG/ML
1 SOLUTION/ DROPS OPHTHALMIC NIGHTLY
Qty: 3 BOTTLE | Refills: 6 | Status: SHIPPED | OUTPATIENT
Start: 2019-03-19 | End: 2019-08-28 | Stop reason: SDUPTHER

## 2019-03-19 RX ORDER — BRIMONIDINE TARTRATE 2 MG/ML
1 SOLUTION/ DROPS OPHTHALMIC EVERY 8 HOURS
Qty: 10 ML | Refills: 6 | Status: SHIPPED | OUTPATIENT
Start: 2019-03-19 | End: 2019-08-28 | Stop reason: SDUPTHER

## 2019-03-19 NOTE — PROGRESS NOTES
HPI     Glaucoma      Additional comments: 4 month IOP check              Comments     4 month IOP check  No pain or discomfort  [facility] stable, wears readers only  Patient needs Glaucoma drops to be faxed to [phone] /[phone] / and he   also needs copy     Stroke 6/2/2016  +GLAUCOMA X MANY YEARS  MINISHUNT OS BY MGM  +DM X 13 YEARS  PCIOL OD +19.5 SN60WF / CDE: 15:15 / 8-10-17  PCIOL OS +18.5 SN60WF / CDE: 17.58 / 7/27/2017    OU BRIMONIDINE 0.15% TID , Cosopt BID , LATANOPROST QHS              Last edited by Juju Moran on 3/19/2019  1:35 PM. (History)            Assessment /Plan     For exam results, see Encounter Report.      ICD-10-CM ICD-9-CM    1. Primary open angle glaucoma of both eyes, severe stage H40.1133 365.11 brimonidine 0.2% (ALPHAGAN) 0.2 % Drop     365.73 dorzolamide-timolol 2-0.5% (COSOPT) 22.3-6.8 mg/mL ophthalmic solution      latanoprost 0.005 % ophthalmic solution    Doing well - intraocular pressure is within acceptable range relative to target pressure with no evidence of progression.   Continue current treatment.  Reviewed importance of continued compliance with treatment and follow up.      2. Pseudophakia of both eyes Z96.1 V43.1 Stable    3. 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 Yag laser treatment as needed if visual loss occurs.            4. Uncontrolled type 2 diabetes mellitus with stage 3 chronic kidney disease, with long-term current use of insulin E11.22 250.52 Dilated 12/5/18    E11.65 585.3     N18.3 V58.67     Z79.4       OU BRIMONIDINE 0.2% TID (switched from 0.15 today), Cosopt BID , LATANOPROST QHS  Return to clinic 4 months with HVF, GOCT, and IOP check

## 2019-08-28 ENCOUNTER — OFFICE VISIT (OUTPATIENT)
Dept: OPHTHALMOLOGY | Facility: CLINIC | Age: 74
End: 2019-08-28
Payer: MEDICARE

## 2019-08-28 DIAGNOSIS — H26.493 PCO (POSTERIOR CAPSULAR OPACIFICATION), BILATERAL: ICD-10-CM

## 2019-08-28 DIAGNOSIS — Z96.1 PSEUDOPHAKIA OF BOTH EYES: ICD-10-CM

## 2019-08-28 DIAGNOSIS — H40.1133 PRIMARY OPEN ANGLE GLAUCOMA OF BOTH EYES, SEVERE STAGE: Primary | ICD-10-CM

## 2019-08-28 PROCEDURE — 99999 PR PBB SHADOW E&M-EST. PATIENT-LVL III: CPT | Mod: PBBFAC,,, | Performed by: OPHTHALMOLOGY

## 2019-08-28 PROCEDURE — 92012 INTRM OPH EXAM EST PATIENT: CPT | Mod: S$GLB,,, | Performed by: OPHTHALMOLOGY

## 2019-08-28 PROCEDURE — 92133 POSTERIOR SEGMENT OCT OPTIC NERVE(OCULAR COHERENCE TOMOGRAPHY) - OU - BOTH EYES: ICD-10-PCS | Mod: S$GLB,,, | Performed by: OPHTHALMOLOGY

## 2019-08-28 PROCEDURE — 92083 EXTENDED VISUAL FIELD XM: CPT | Mod: S$GLB,,, | Performed by: OPHTHALMOLOGY

## 2019-08-28 PROCEDURE — 99999 PR PBB SHADOW E&M-EST. PATIENT-LVL III: ICD-10-PCS | Mod: PBBFAC,,, | Performed by: OPHTHALMOLOGY

## 2019-08-28 PROCEDURE — 92012 PR EYE EXAM, EST PATIENT,INTERMED: ICD-10-PCS | Mod: S$GLB,,, | Performed by: OPHTHALMOLOGY

## 2019-08-28 PROCEDURE — 92133 CPTRZD OPH DX IMG PST SGM ON: CPT | Mod: S$GLB,,, | Performed by: OPHTHALMOLOGY

## 2019-08-28 PROCEDURE — 92083 HUMPHREY VISUAL FIELD - OU - BOTH EYES: ICD-10-PCS | Mod: S$GLB,,, | Performed by: OPHTHALMOLOGY

## 2019-08-28 RX ORDER — DORZOLAMIDE HYDROCHLORIDE AND TIMOLOL MALEATE 20; 5 MG/ML; MG/ML
1 SOLUTION/ DROPS OPHTHALMIC 2 TIMES DAILY
Qty: 30 ML | Refills: 6 | Status: SHIPPED | OUTPATIENT
Start: 2019-08-28 | End: 2020-02-12 | Stop reason: SDUPTHER

## 2019-08-28 RX ORDER — BRIMONIDINE TARTRATE 2 MG/ML
1 SOLUTION/ DROPS OPHTHALMIC EVERY 8 HOURS
Qty: 30 ML | Refills: 6 | Status: SHIPPED | OUTPATIENT
Start: 2019-08-28 | End: 2020-02-12 | Stop reason: SDUPTHER

## 2019-08-28 RX ORDER — LATANOPROST 50 UG/ML
1 SOLUTION/ DROPS OPHTHALMIC NIGHTLY
Qty: 3 BOTTLE | Refills: 6 | Status: SHIPPED | OUTPATIENT
Start: 2019-08-28 | End: 2020-02-12 | Stop reason: SDUPTHER

## 2019-08-28 NOTE — PROGRESS NOTES
HPI     Glaucoma      Additional comments: 4 mth hvf, goct, iop check               Comments     Stroke 6/2/2016  +GLAUCOMA X MANY YEARS  MINISHUNT OS BY MGM  +DM X 13 YEARS  PCIOL OD +19.5 SN60WF / CDE: 15:15 / 8-10-17  PCIOL OS +18.5 SN60WF / CDE: 17.58 / 7/27/2017    OU BRIMONIDINE 0.2% TID , Cosopt BID , LATANOPROST QHS          Last edited by Pat Garcia MA on 8/28/2019  2:38 PM. (History)            Assessment /Plan     For exam results, see Encounter Report.      ICD-10-CM ICD-9-CM    1. Primary open angle glaucoma of both eyes, severe stage H40.1133 365.11 Posterior Segment OCT Optic Nerve- Both eyes     365.73 Montanez Visual Field - Extended - OU - Both Eyes         2. Pseudophakia of both eyes Z96.1 V43.1 Well    3. 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 Yag laser treatment as needed if visual loss occurs.        4. Uncontrolled type 2 diabetes mellitus with stage 3 chronic kidney disease, with long-term current use of insulin E11.22 250.52 Follow, dilated at next visit     E11.65 585.3     N18.3 V58.67     Z79.4         RETURN TO CLINIC 4 months DOA, Consider yag then if sxs worsen     OU BRIMONIDINE 0.2% TID , Cosopt BID , LATANOPROST QHS

## 2020-02-12 ENCOUNTER — OFFICE VISIT (OUTPATIENT)
Dept: OPHTHALMOLOGY | Facility: CLINIC | Age: 75
End: 2020-02-12
Payer: MEDICARE

## 2020-02-12 DIAGNOSIS — H40.1133 PRIMARY OPEN ANGLE GLAUCOMA OF BOTH EYES, SEVERE STAGE: ICD-10-CM

## 2020-02-12 DIAGNOSIS — H40.1133 PRIMARY OPEN ANGLE GLAUCOMA OF BOTH EYES, SEVERE STAGE: Primary | ICD-10-CM

## 2020-02-12 DIAGNOSIS — Z96.1 PSEUDOPHAKIA OF BOTH EYES: ICD-10-CM

## 2020-02-12 DIAGNOSIS — H26.493 PCO (POSTERIOR CAPSULAR OPACIFICATION), BILATERAL: ICD-10-CM

## 2020-02-12 PROCEDURE — 92014 PR EYE EXAM, EST PATIENT,COMPREHESV: ICD-10-PCS | Mod: S$GLB,,, | Performed by: OPHTHALMOLOGY

## 2020-02-12 PROCEDURE — 92014 COMPRE OPH EXAM EST PT 1/>: CPT | Mod: S$GLB,,, | Performed by: OPHTHALMOLOGY

## 2020-02-12 PROCEDURE — 99999 PR PBB SHADOW E&M-EST. PATIENT-LVL I: ICD-10-PCS | Mod: PBBFAC,,, | Performed by: OPHTHALMOLOGY

## 2020-02-12 PROCEDURE — 99999 PR PBB SHADOW E&M-EST. PATIENT-LVL I: CPT | Mod: PBBFAC,,, | Performed by: OPHTHALMOLOGY

## 2020-02-12 RX ORDER — BRIMONIDINE TARTRATE 2 MG/ML
1 SOLUTION/ DROPS OPHTHALMIC EVERY 8 HOURS
Qty: 30 ML | Refills: 6 | Status: SHIPPED | OUTPATIENT
Start: 2020-02-12 | End: 2021-02-11

## 2020-02-12 RX ORDER — LATANOPROST 50 UG/ML
1 SOLUTION/ DROPS OPHTHALMIC NIGHTLY
Qty: 3 BOTTLE | Refills: 6 | Status: SHIPPED | OUTPATIENT
Start: 2020-02-12

## 2020-02-12 RX ORDER — DORZOLAMIDE HYDROCHLORIDE AND TIMOLOL MALEATE 20; 5 MG/ML; MG/ML
1 SOLUTION/ DROPS OPHTHALMIC 2 TIMES DAILY
Qty: 30 ML | Refills: 6 | Status: SHIPPED | OUTPATIENT
Start: 2020-02-12

## 2020-02-12 NOTE — PROGRESS NOTES
HPI     Glaucoma      Additional comments: 4M ADAN               Comments     The patient states his eyes are doing well and he denies any ocular pain   at this time.    1. Stroke 6/2/2016  2. +GLAUCOMA   MINISHUNT OS BY MGM  3. +DM X 13 YEARS  4. PCIOL OD +19.5 SN60WF / CDE: 15:15 / 8-10-17  PCIOL OS +18.5 SN60WF / CDE: 17.58 / 7/27/2017  PCO OU    OU- BRIMONIDINE 0.2% TID , Cosopt BID , LATANOPROST QHS    PT NOT USING TRUSOPT- WAS TO BE SPLIT COSOPT          Last edited by Tatiana Garland on 2/12/2020  3:11 PM. (History)            Assessment /Plan     For exam results, see Encounter Report.      ICD-10-CM ICD-9-CM    1. Primary open angle glaucoma of both eyes, severe stage H40.1133 365.11 Doing well - intraocular pressure is within acceptable range relative to target pressure with no evidence of progression.   Continue current treatment.  Reviewed importance of continued compliance with treatment and follow up.        365.73    2. Uncontrolled type 2 diabetes mellitus with stage 3 chronic kidney disease, with long-term current use of insulin E11.22 250.52 Diabetes with no diabetic retinopathy on dilated exam.   Reviewed diabetic eye precautions including excellent blood sugar control, and importance of regular follow up.     E11.65 585.3     N18.3 V58.67     Z79.4     3. Pseudophakia of both eyes Z96.1 V43.1 Follow   4. PCO (posterior capsular opacification), bilateral H26.493 366.50 Stable, Follow       3M IOP check

## 2020-05-13 ENCOUNTER — OFFICE VISIT (OUTPATIENT)
Dept: OPHTHALMOLOGY | Facility: CLINIC | Age: 75
End: 2020-05-13
Payer: MEDICARE

## 2020-05-13 DIAGNOSIS — H26.493 PCO (POSTERIOR CAPSULAR OPACIFICATION), BILATERAL: ICD-10-CM

## 2020-05-13 DIAGNOSIS — Z96.1 PSEUDOPHAKIA OF BOTH EYES: ICD-10-CM

## 2020-05-13 DIAGNOSIS — H40.1133 PRIMARY OPEN ANGLE GLAUCOMA OF BOTH EYES, SEVERE STAGE: Primary | ICD-10-CM

## 2020-05-13 PROCEDURE — 99999 PR PBB SHADOW E&M-EST. PATIENT-LVL I: ICD-10-PCS | Mod: PBBFAC,,, | Performed by: OPHTHALMOLOGY

## 2020-05-13 PROCEDURE — 92012 PR EYE EXAM, EST PATIENT,INTERMED: ICD-10-PCS | Mod: S$GLB,,, | Performed by: OPHTHALMOLOGY

## 2020-05-13 PROCEDURE — 99999 PR PBB SHADOW E&M-EST. PATIENT-LVL I: CPT | Mod: PBBFAC,,, | Performed by: OPHTHALMOLOGY

## 2020-05-13 PROCEDURE — 92012 INTRM OPH EXAM EST PATIENT: CPT | Mod: S$GLB,,, | Performed by: OPHTHALMOLOGY

## 2020-05-13 NOTE — PROGRESS NOTES
HPI     3 months glaucoma check (IOP check)    No eye pain and no new eye changes since patient's last visit    1. Stroke 6/2/2016  2. +GLAUCOMA   MINISHUNT OS BY MGM  3. +DM X 13 YEARS  4. PCIOL OD +19.5 SN60WF / CDE: 15:15 / 8-10-17  PCIOL OS +18.5 SN60WF / CDE: 17.58 / 7/27/2017  PCO OU    OU- BRIMONIDINE 0.2% TID , Cosopt BID , LATANOPROST QHS    Last edited by Nikkie Chi on 5/13/2020 11:23 AM. (History)            Assessment /Plan     For exam results, see Encounter Report.      ICD-10-CM ICD-9-CM    1. Primary open angle glaucoma of both eyes, severe stage H40.1133 365.11 Doing well - intraocular pressure is within acceptable range relative to target pressure with no evidence of progression.   Continue current treatment.  Reviewed importance of continued compliance with treatment and follow up.        365.73    2. Uncontrolled type 2 diabetes mellitus with stage 3 chronic kidney disease, with long-term current use of insulin E11.22 250.52 Dilated 2/12/20    E11.65 585.3     N18.3 V58.67     Z79.4     3. Pseudophakia of both eyes Z96.1 V43.1 Stable    4. 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 Yag laser treatment as needed if visual loss occurs.              OU- BRIMONIDINE 0.2% TID , Cosopt BID , LATANOPROST QHS     Return to clinic 3 months with HVF, GOCT, and IOP check     JL

## 2020-08-04 ENCOUNTER — OFFICE VISIT (OUTPATIENT)
Dept: OPHTHALMOLOGY | Facility: CLINIC | Age: 75
End: 2020-08-04
Payer: MEDICARE

## 2020-08-04 DIAGNOSIS — H40.1133 PRIMARY OPEN ANGLE GLAUCOMA OF BOTH EYES, SEVERE STAGE: Primary | ICD-10-CM

## 2020-08-04 PROCEDURE — 99999 PR PBB SHADOW E&M-EST. PATIENT-LVL III: ICD-10-PCS | Mod: PBBFAC,,, | Performed by: OPHTHALMOLOGY

## 2020-08-04 PROCEDURE — 92083 HUMPHREY VISUAL FIELD - OU - BOTH EYES: ICD-10-PCS | Mod: S$GLB,,, | Performed by: OPHTHALMOLOGY

## 2020-08-04 PROCEDURE — 92012 INTRM OPH EXAM EST PATIENT: CPT | Mod: S$GLB,,, | Performed by: OPHTHALMOLOGY

## 2020-08-04 PROCEDURE — 92083 EXTENDED VISUAL FIELD XM: CPT | Mod: S$GLB,,, | Performed by: OPHTHALMOLOGY

## 2020-08-04 PROCEDURE — 92133 POSTERIOR SEGMENT OCT OPTIC NERVE(OCULAR COHERENCE TOMOGRAPHY) - OU - BOTH EYES: ICD-10-PCS | Mod: S$GLB,,, | Performed by: OPHTHALMOLOGY

## 2020-08-04 PROCEDURE — 92012 PR EYE EXAM, EST PATIENT,INTERMED: ICD-10-PCS | Mod: S$GLB,,, | Performed by: OPHTHALMOLOGY

## 2020-08-04 PROCEDURE — 99999 PR PBB SHADOW E&M-EST. PATIENT-LVL III: CPT | Mod: PBBFAC,,, | Performed by: OPHTHALMOLOGY

## 2020-08-04 PROCEDURE — 92133 CPTRZD OPH DX IMG PST SGM ON: CPT | Mod: S$GLB,,, | Performed by: OPHTHALMOLOGY

## 2020-08-04 NOTE — PROGRESS NOTES
HPI     Glaucoma     Comments: 3M HVF, GOCT, IOP check              Comments     The patient states his eyes are doing well and he denies any ocular pain   at this time.    1. Stroke 6/2/2016  2. +GLAUCOMA   MINISHUNT OS BY MGM  3. +DM X 13 YEARS  4. PCIOL OD +19.5 SN60WF / CDE: 15:15 / 8-10-17  PCIOL OS +18.5 SN60WF / CDE: 17.58 / 7/27/2017  PCO OU    OU- BRIMONIDINE 0.2% TID , Cosopt BID , LATANOPROST QHS    PT NOT USING TRUSOPT- WAS TO BE SPLIT COSOPT          Last edited by Tatiana Garland on 8/4/2020  1:56 PM. (History)            Assessment /Plan     For exam results, see Encounter Report.      ICD-10-CM ICD-9-CM    1. Primary open angle glaucoma of both eyes, severe stage  H40.1133 365.11 Montanez Visual Field - OU - Extended - Both Eyes     365.73 Posterior Segment OCT Optic Nerve- Both eyes   Doing well - intraocular pressure is within acceptable range relative to target pressure with no evidence of progression.   Continue current treatment.  Reviewed importance of continued compliance with treatment and follow up.            OU- BRIMONIDINE 0.2% TID , Cosopt BID , LATANOPROST QHS    RETURN TO CLINIC 4 month IOP

## 2020-11-04 ENCOUNTER — OFFICE VISIT (OUTPATIENT)
Dept: OPHTHALMOLOGY | Facility: CLINIC | Age: 75
End: 2020-11-04
Payer: MEDICARE

## 2020-11-04 DIAGNOSIS — Z96.1 PSEUDOPHAKIA OF BOTH EYES: ICD-10-CM

## 2020-11-04 DIAGNOSIS — H40.1133 PRIMARY OPEN ANGLE GLAUCOMA OF BOTH EYES, SEVERE STAGE: Primary | ICD-10-CM

## 2020-11-04 DIAGNOSIS — H04.123 DRY EYE SYNDROME, BILATERAL: ICD-10-CM

## 2020-11-04 PROCEDURE — 99999 PR PBB SHADOW E&M-EST. PATIENT-LVL III: CPT | Mod: PBBFAC,,, | Performed by: OPHTHALMOLOGY

## 2020-11-04 PROCEDURE — 92012 PR EYE EXAM, EST PATIENT,INTERMED: ICD-10-PCS | Mod: S$GLB,,, | Performed by: OPHTHALMOLOGY

## 2020-11-04 PROCEDURE — 99999 PR PBB SHADOW E&M-EST. PATIENT-LVL III: ICD-10-PCS | Mod: PBBFAC,,, | Performed by: OPHTHALMOLOGY

## 2020-11-04 PROCEDURE — 92012 INTRM OPH EXAM EST PATIENT: CPT | Mod: S$GLB,,, | Performed by: OPHTHALMOLOGY

## 2020-11-04 NOTE — PROGRESS NOTES
HPI     Glaucoma     Comments: 3m iop ck              Comments     Pt states va ou is doing well. Pt compliant with all gtts x3m.      1. Stroke 6/2/2016  2. +GLAUCOMA   MINISHUNT OS BY MGM  3. +DM X 13 YEARS  4. PCIOL OD +19.5 SN60WF / CDE: 15:15 / 8-10-17  PCIOL OS +18.5 SN60WF / CDE: 17.58 / 7/27/2017  PCO OU    OU- BRIMONIDINE 0.2% TID , Cosopt BID , LATANOPROST QHS    PT NOT USING TRUSOPT- WAS TO BE SPLIT COSOPT              Last edited by Laila Frausto, Patient Care Assistant on 11/4/2020 10:56   AM. (History)            Assessment /Plan     For exam results, see Encounter Report.      ICD-10-CM ICD-9-CM    1. Primary open angle glaucoma of both eyes, severe stage  H40.1133 365.11 Doing well - intraocular pressure is within acceptable range relative to target pressure with no evidence of progression.   Continue current treatment.  Reviewed importance of continued compliance with treatment and follow up.        365.73    2. Uncontrolled type 2 diabetes mellitus with stage 3 chronic kidney disease, with long-term current use of insulin  E11.22 250.52 Not dilated today    E11.65 585.3     N18.30 V58.67     Z79.4     3. Pseudophakia of both eyes  Z96.1 V43.1          OU- BRIMONIDINE 0.2% TID , Cosopt BID , LATANOPROST QHS    Add systane ultra and gel at bedtime to help with dry eye     RETURN TO CLINIC 4 months BRANDON ARELLANO

## 2021-02-05 ENCOUNTER — OFFICE VISIT (OUTPATIENT)
Dept: OPHTHALMOLOGY | Facility: CLINIC | Age: 76
End: 2021-02-05
Payer: MEDICARE

## 2021-02-05 DIAGNOSIS — H04.123 DRY EYE SYNDROME, BILATERAL: ICD-10-CM

## 2021-02-05 DIAGNOSIS — H40.1133 PRIMARY OPEN ANGLE GLAUCOMA OF BOTH EYES, SEVERE STAGE: Primary | ICD-10-CM

## 2021-02-05 DIAGNOSIS — Z96.1 PSEUDOPHAKIA OF BOTH EYES: ICD-10-CM

## 2021-02-05 DIAGNOSIS — H26.493 PCO (POSTERIOR CAPSULAR OPACIFICATION), BILATERAL: ICD-10-CM

## 2021-02-05 PROCEDURE — 3051F HG A1C>EQUAL 7.0%<8.0%: CPT | Mod: CPTII,S$GLB,, | Performed by: OPHTHALMOLOGY

## 2021-02-05 PROCEDURE — 99214 PR OFFICE/OUTPT VISIT, EST, LEVL IV, 30-39 MIN: ICD-10-PCS | Mod: S$GLB,,, | Performed by: OPHTHALMOLOGY

## 2021-02-05 PROCEDURE — 99214 OFFICE O/P EST MOD 30 MIN: CPT | Mod: S$GLB,,, | Performed by: OPHTHALMOLOGY

## 2021-02-05 PROCEDURE — 99999 PR PBB SHADOW E&M-EST. PATIENT-LVL III: CPT | Mod: PBBFAC,,, | Performed by: OPHTHALMOLOGY

## 2021-02-05 PROCEDURE — 1159F MED LIST DOCD IN RCRD: CPT | Mod: S$GLB,,, | Performed by: OPHTHALMOLOGY

## 2021-02-05 PROCEDURE — 92250 COLOR FUNDUS PHOTOGRAPHY - OU - BOTH EYES: ICD-10-PCS | Mod: S$GLB,,, | Performed by: OPHTHALMOLOGY

## 2021-02-05 PROCEDURE — 3051F PR MOST RECENT HEMOGLOBIN A1C LEVEL 7.0 - < 8.0%: ICD-10-PCS | Mod: CPTII,S$GLB,, | Performed by: OPHTHALMOLOGY

## 2021-02-05 PROCEDURE — 1159F PR MEDICATION LIST DOCUMENTED IN MEDICAL RECORD: ICD-10-PCS | Mod: S$GLB,,, | Performed by: OPHTHALMOLOGY

## 2021-02-05 PROCEDURE — 92250 FUNDUS PHOTOGRAPHY W/I&R: CPT | Mod: S$GLB,,, | Performed by: OPHTHALMOLOGY

## 2021-02-05 PROCEDURE — 99999 PR PBB SHADOW E&M-EST. PATIENT-LVL III: ICD-10-PCS | Mod: PBBFAC,,, | Performed by: OPHTHALMOLOGY

## 2021-05-07 ENCOUNTER — OFFICE VISIT (OUTPATIENT)
Dept: OPHTHALMOLOGY | Facility: CLINIC | Age: 76
End: 2021-05-07
Payer: MEDICARE

## 2021-05-07 DIAGNOSIS — H26.493 PCO (POSTERIOR CAPSULAR OPACIFICATION), BILATERAL: ICD-10-CM

## 2021-05-07 DIAGNOSIS — Z96.1 PSEUDOPHAKIA OF BOTH EYES: ICD-10-CM

## 2021-05-07 DIAGNOSIS — H40.1133 PRIMARY OPEN ANGLE GLAUCOMA OF BOTH EYES, SEVERE STAGE: Primary | ICD-10-CM

## 2021-05-07 DIAGNOSIS — H04.123 DRY EYE SYNDROME, BILATERAL: ICD-10-CM

## 2021-05-07 PROCEDURE — 99999 PR PBB SHADOW E&M-EST. PATIENT-LVL III: ICD-10-PCS | Mod: PBBFAC,,, | Performed by: OPHTHALMOLOGY

## 2021-05-07 PROCEDURE — 3051F PR MOST RECENT HEMOGLOBIN A1C LEVEL 7.0 - < 8.0%: ICD-10-PCS | Mod: CPTII,S$GLB,, | Performed by: OPHTHALMOLOGY

## 2021-05-07 PROCEDURE — 1159F PR MEDICATION LIST DOCUMENTED IN MEDICAL RECORD: ICD-10-PCS | Mod: S$GLB,,, | Performed by: OPHTHALMOLOGY

## 2021-05-07 PROCEDURE — 99999 PR PBB SHADOW E&M-EST. PATIENT-LVL III: CPT | Mod: PBBFAC,,, | Performed by: OPHTHALMOLOGY

## 2021-05-07 PROCEDURE — 3051F HG A1C>EQUAL 7.0%<8.0%: CPT | Mod: CPTII,S$GLB,, | Performed by: OPHTHALMOLOGY

## 2021-05-07 PROCEDURE — 99214 PR OFFICE/OUTPT VISIT, EST, LEVL IV, 30-39 MIN: ICD-10-PCS | Mod: S$GLB,,, | Performed by: OPHTHALMOLOGY

## 2021-05-07 PROCEDURE — 1159F MED LIST DOCD IN RCRD: CPT | Mod: S$GLB,,, | Performed by: OPHTHALMOLOGY

## 2021-05-07 PROCEDURE — 99214 OFFICE O/P EST MOD 30 MIN: CPT | Mod: S$GLB,,, | Performed by: OPHTHALMOLOGY

## 2021-05-07 RX ORDER — HYDROCHLOROTHIAZIDE 50 MG/1
TABLET ORAL
COMMUNITY
Start: 2021-03-17

## 2021-08-10 ENCOUNTER — OFFICE VISIT (OUTPATIENT)
Dept: OPHTHALMOLOGY | Facility: CLINIC | Age: 76
End: 2021-08-10
Payer: MEDICARE

## 2021-08-10 DIAGNOSIS — H26.493 PCO (POSTERIOR CAPSULAR OPACIFICATION), BILATERAL: ICD-10-CM

## 2021-08-10 DIAGNOSIS — H40.1133 PRIMARY OPEN ANGLE GLAUCOMA OF BOTH EYES, SEVERE STAGE: Primary | ICD-10-CM

## 2021-08-10 DIAGNOSIS — Z96.1 PSEUDOPHAKIA OF BOTH EYES: ICD-10-CM

## 2021-08-10 DIAGNOSIS — H04.123 DRY EYE SYNDROME, BILATERAL: ICD-10-CM

## 2021-08-10 PROCEDURE — 1160F RVW MEDS BY RX/DR IN RCRD: CPT | Mod: CPTII,S$GLB,, | Performed by: OPHTHALMOLOGY

## 2021-08-10 PROCEDURE — 99999 PR PBB SHADOW E&M-EST. PATIENT-LVL III: ICD-10-PCS | Mod: PBBFAC,,, | Performed by: OPHTHALMOLOGY

## 2021-08-10 PROCEDURE — 92133 POSTERIOR SEGMENT OCT OPTIC NERVE(OCULAR COHERENCE TOMOGRAPHY) - OU - BOTH EYES: ICD-10-PCS | Mod: S$GLB,,, | Performed by: OPHTHALMOLOGY

## 2021-08-10 PROCEDURE — 1160F PR REVIEW ALL MEDS BY PRESCRIBER/CLIN PHARMACIST DOCUMENTED: ICD-10-PCS | Mod: CPTII,S$GLB,, | Performed by: OPHTHALMOLOGY

## 2021-08-10 PROCEDURE — 99213 PR OFFICE/OUTPT VISIT, EST, LEVL III, 20-29 MIN: ICD-10-PCS | Mod: S$GLB,,, | Performed by: OPHTHALMOLOGY

## 2021-08-10 PROCEDURE — 3051F PR MOST RECENT HEMOGLOBIN A1C LEVEL 7.0 - < 8.0%: ICD-10-PCS | Mod: CPTII,S$GLB,, | Performed by: OPHTHALMOLOGY

## 2021-08-10 PROCEDURE — 1159F MED LIST DOCD IN RCRD: CPT | Mod: CPTII,S$GLB,, | Performed by: OPHTHALMOLOGY

## 2021-08-10 PROCEDURE — 99999 PR PBB SHADOW E&M-EST. PATIENT-LVL III: CPT | Mod: PBBFAC,,, | Performed by: OPHTHALMOLOGY

## 2021-08-10 PROCEDURE — 99213 OFFICE O/P EST LOW 20 MIN: CPT | Mod: S$GLB,,, | Performed by: OPHTHALMOLOGY

## 2021-08-10 PROCEDURE — 92133 CPTRZD OPH DX IMG PST SGM ON: CPT | Mod: S$GLB,,, | Performed by: OPHTHALMOLOGY

## 2021-08-10 PROCEDURE — 1159F PR MEDICATION LIST DOCUMENTED IN MEDICAL RECORD: ICD-10-PCS | Mod: CPTII,S$GLB,, | Performed by: OPHTHALMOLOGY

## 2021-08-10 PROCEDURE — 3051F HG A1C>EQUAL 7.0%<8.0%: CPT | Mod: CPTII,S$GLB,, | Performed by: OPHTHALMOLOGY

## 2022-07-28 ENCOUNTER — TELEPHONE (OUTPATIENT)
Dept: OPHTHALMOLOGY | Facility: CLINIC | Age: 77
End: 2022-07-28
Payer: MEDICARE

## 2022-07-28 NOTE — TELEPHONE ENCOUNTER
Patient scheduled with Dr Kauffman for IOP check at 845 8/1/22 at [facility]        ----- Message from Sharita Zuleta sent at 7/28/2022 12:51 PM CDT -----  [phone] pt need pressure check asap with [facility] only  [facility] and told to get in with [facility] rdz his pressure was high

## 2022-08-01 ENCOUNTER — OFFICE VISIT (OUTPATIENT)
Dept: OPHTHALMOLOGY | Facility: CLINIC | Age: 77
End: 2022-08-01
Payer: MEDICARE

## 2022-08-01 DIAGNOSIS — Z98.41 CATARACT EXTRACTION STATUS, RIGHT: ICD-10-CM

## 2022-08-01 DIAGNOSIS — H40.1133 PRIMARY OPEN ANGLE GLAUCOMA OF BOTH EYES, SEVERE STAGE: Primary | ICD-10-CM

## 2022-08-01 DIAGNOSIS — Z98.42 CATARACT EXTRACTION STATUS, LEFT: ICD-10-CM

## 2022-08-01 PROCEDURE — 1160F PR REVIEW ALL MEDS BY PRESCRIBER/CLIN PHARMACIST DOCUMENTED: ICD-10-PCS | Mod: CPTII,S$GLB,, | Performed by: OPHTHALMOLOGY

## 2022-08-01 PROCEDURE — 99214 PR OFFICE/OUTPT VISIT, EST, LEVL IV, 30-39 MIN: ICD-10-PCS | Mod: S$GLB,,, | Performed by: OPHTHALMOLOGY

## 2022-08-01 PROCEDURE — 1159F MED LIST DOCD IN RCRD: CPT | Mod: CPTII,S$GLB,, | Performed by: OPHTHALMOLOGY

## 2022-08-01 PROCEDURE — 2023F PR DILATED RETINAL EXAM W/O EVID OF RETINOPATHY: ICD-10-PCS | Mod: CPTII,S$GLB,, | Performed by: OPHTHALMOLOGY

## 2022-08-01 PROCEDURE — 1159F PR MEDICATION LIST DOCUMENTED IN MEDICAL RECORD: ICD-10-PCS | Mod: CPTII,S$GLB,, | Performed by: OPHTHALMOLOGY

## 2022-08-01 PROCEDURE — 99999 PR PBB SHADOW E&M-EST. PATIENT-LVL II: CPT | Mod: PBBFAC,,, | Performed by: OPHTHALMOLOGY

## 2022-08-01 PROCEDURE — 2023F DILAT RTA XM W/O RTNOPTHY: CPT | Mod: CPTII,S$GLB,, | Performed by: OPHTHALMOLOGY

## 2022-08-01 PROCEDURE — 99999 PR PBB SHADOW E&M-EST. PATIENT-LVL II: ICD-10-PCS | Mod: PBBFAC,,, | Performed by: OPHTHALMOLOGY

## 2022-08-01 PROCEDURE — 1160F RVW MEDS BY RX/DR IN RCRD: CPT | Mod: CPTII,S$GLB,, | Performed by: OPHTHALMOLOGY

## 2022-08-01 PROCEDURE — 99214 OFFICE O/P EST MOD 30 MIN: CPT | Mod: S$GLB,,, | Performed by: OPHTHALMOLOGY

## 2022-08-01 NOTE — PROGRESS NOTES
HPI     Glaucoma     Comments: 3 mth IOP               Comments     1. Stroke 6/2/2016   2. +GLAUCOMA   MINISHUNT OS BY MGM   3. +DM X 13 YEARS   4. PCIOL OD +19.5 SN60WF / CDE: 15:15 / 8-10-17   PCIOL OS +18.5 SN60WF / CDE: 17.58 / 7/27/2017   PCO OU     OU- BRIMONIDINE 0.2% TID , Cosopt BID , LATANOPROST QHS          Last edited by Liz White MA on 8/1/2022  8:39 AM. (History)            Assessment /Plan     For exam results, see Encounter Report.      ICD-10-CM ICD-9-CM    1. Primary open angle glaucoma of both eyes, severe stage  H40.1133 365.11 Doing well - intraocular pressure is within acceptable range relative to target pressure with no evidence of progression.   Continue current treatment.  Reviewed importance of continued compliance with treatment and follow up.        365.73    2. Uncontrolled type 2 diabetes mellitus with stage 3 chronic kidney disease, with long-term current use of insulin  E11.22 250.52 Diabetes with no diabetic retinopathy on dilated exam.   Reviewed diabetic eye precautions including excellent blood sugar control, and importance of regular follow up.           E11.65 585.3     N18.30 V58.67     Z79.4     3. Cataract extraction status, right  Z98.41 V45.61    4. Cataract extraction status, left  Z98.42 V45.61        Return to clinic 4 MONTH IOP AND GOCT AT [facility]   OU- BRIMONIDINE 0.2% TID , Cosopt BID , LATANOPROST QHS

## 2022-12-02 ENCOUNTER — OFFICE VISIT (OUTPATIENT)
Dept: OPHTHALMOLOGY | Facility: CLINIC | Age: 77
End: 2022-12-02
Payer: MEDICARE

## 2022-12-02 DIAGNOSIS — H04.123 DRY EYE SYNDROME, BILATERAL: ICD-10-CM

## 2022-12-02 DIAGNOSIS — Z98.41 CATARACT EXTRACTION STATUS, RIGHT: ICD-10-CM

## 2022-12-02 DIAGNOSIS — H40.1133 PRIMARY OPEN ANGLE GLAUCOMA OF BOTH EYES, SEVERE STAGE: Primary | ICD-10-CM

## 2022-12-02 DIAGNOSIS — Z98.42 CATARACT EXTRACTION STATUS, LEFT: ICD-10-CM

## 2022-12-02 PROCEDURE — 1159F MED LIST DOCD IN RCRD: CPT | Mod: CPTII,S$GLB,, | Performed by: OPHTHALMOLOGY

## 2022-12-02 PROCEDURE — 1160F RVW MEDS BY RX/DR IN RCRD: CPT | Mod: CPTII,S$GLB,, | Performed by: OPHTHALMOLOGY

## 2022-12-02 PROCEDURE — 1160F PR REVIEW ALL MEDS BY PRESCRIBER/CLIN PHARMACIST DOCUMENTED: ICD-10-PCS | Mod: CPTII,S$GLB,, | Performed by: OPHTHALMOLOGY

## 2022-12-02 PROCEDURE — 99214 OFFICE O/P EST MOD 30 MIN: CPT | Mod: S$GLB,,, | Performed by: OPHTHALMOLOGY

## 2022-12-02 PROCEDURE — 99214 PR OFFICE/OUTPT VISIT, EST, LEVL IV, 30-39 MIN: ICD-10-PCS | Mod: S$GLB,,, | Performed by: OPHTHALMOLOGY

## 2022-12-02 PROCEDURE — 92133 POSTERIOR SEGMENT OCT OPTIC NERVE(OCULAR COHERENCE TOMOGRAPHY) - OU - BOTH EYES: ICD-10-PCS | Mod: S$GLB,,, | Performed by: OPHTHALMOLOGY

## 2022-12-02 PROCEDURE — 99999 PR PBB SHADOW E&M-EST. PATIENT-LVL III: CPT | Mod: PBBFAC,,, | Performed by: OPHTHALMOLOGY

## 2022-12-02 PROCEDURE — 99999 PR PBB SHADOW E&M-EST. PATIENT-LVL III: ICD-10-PCS | Mod: PBBFAC,,, | Performed by: OPHTHALMOLOGY

## 2022-12-02 PROCEDURE — 92133 CPTRZD OPH DX IMG PST SGM ON: CPT | Mod: S$GLB,,, | Performed by: OPHTHALMOLOGY

## 2022-12-02 PROCEDURE — 1159F PR MEDICATION LIST DOCUMENTED IN MEDICAL RECORD: ICD-10-PCS | Mod: CPTII,S$GLB,, | Performed by: OPHTHALMOLOGY

## 2022-12-02 NOTE — PROGRESS NOTES
HPI     Glaucoma            Comments: 4m GOCT, IOP. Denies any pain or irritation. [facility] stable. 100%   compliant with gtts.           Comments    1. Stroke 6/2/2016   2. +GLAUCOMA   MINISHUNT OS BY MGM   3. +DM X 13 YEARS   4. PCIOL OD +19.5 SN60WF / CDE: 15:15 / 8-10-17   PCIOL OS +18.5 SN60WF / CDE: 17.58 / 7/27/2017   PCO OU     GCL 25/26--- 08/10/21       OU- BRIMONIDINE 0.2% TID , Cosopt BID , LATANOPROST QHS            Last edited by Saurav Ambrosio on 12/2/2022 10:50 AM.            Assessment /Plan     For exam results, see Encounter Report.      ICD-10-CM ICD-9-CM    1. Primary open angle glaucoma of both eyes, severe stage  H40.1133 365.11 Posterior Segment OCT Optic Nerve- Both eyes    Doing well - intraocular pressure is within acceptable range relative to target pressure with no evidence of progression.   Continue current treatment.  Reviewed importance of continued compliance with treatment and follow up.        365.73       2. Cataract extraction status, right  Z98.41 V45.61 Stable, monitor      3. Cataract extraction status, left  Z98.42 V45.61 As above       4. Dry eye syndrome, bilateral  H04.123 375.15 Findings and symptoms consistent with mild dry eyes.   Recommend regular use of Artificial Tears. These should be thought of as Ocular Surface Moisturizers similar to skin moisturizers in that regular use is required to achieve maximum benefit.  Specifically, I recommend the following:    Systane Ultra or Refresh Optive Trevor 3 : 3-4 times a day.   The first dose upon awakening is most important because we do not make tears at night.  Avoid generic products as they contain Benzalkonium Chloride as a preservative. This is a very irritating chemical and can make your eyes worse.    Omega 3 Fish Oils  1000 to 2000 mgs per day of Nordic Naturals or PRN Dry Eye formula [facility]              Return to clinic 4 months for IOP            OU- BRIMONIDINE 0.2% TID , Cosopt BID , LATANOPROST QHS

## 2023-09-12 ENCOUNTER — OFFICE VISIT (OUTPATIENT)
Dept: OPHTHALMOLOGY | Facility: CLINIC | Age: 78
End: 2023-09-12
Payer: MEDICARE

## 2023-09-12 DIAGNOSIS — Z98.41 CATARACT EXTRACTION STATUS, RIGHT: ICD-10-CM

## 2023-09-12 DIAGNOSIS — H26.493 PCO (POSTERIOR CAPSULAR OPACIFICATION), BILATERAL: ICD-10-CM

## 2023-09-12 DIAGNOSIS — Z98.42 CATARACT EXTRACTION STATUS, LEFT: ICD-10-CM

## 2023-09-12 DIAGNOSIS — H40.1133 PRIMARY OPEN ANGLE GLAUCOMA OF BOTH EYES, SEVERE STAGE: Primary | ICD-10-CM

## 2023-09-12 PROCEDURE — 99999 PR PBB SHADOW E&M-EST. PATIENT-LVL III: CPT | Mod: PBBFAC,,, | Performed by: OPHTHALMOLOGY

## 2023-09-12 PROCEDURE — 99214 PR OFFICE/OUTPT VISIT, EST, LEVL IV, 30-39 MIN: ICD-10-PCS | Mod: S$GLB,,, | Performed by: OPHTHALMOLOGY

## 2023-09-12 PROCEDURE — 2023F DILAT RTA XM W/O RTNOPTHY: CPT | Mod: CPTII,S$GLB,, | Performed by: OPHTHALMOLOGY

## 2023-09-12 PROCEDURE — 92133 POSTERIOR SEGMENT OCT OPTIC NERVE(OCULAR COHERENCE TOMOGRAPHY) - OU - BOTH EYES: ICD-10-PCS | Mod: S$GLB,,, | Performed by: OPHTHALMOLOGY

## 2023-09-12 PROCEDURE — 1159F MED LIST DOCD IN RCRD: CPT | Mod: CPTII,S$GLB,, | Performed by: OPHTHALMOLOGY

## 2023-09-12 PROCEDURE — 2023F PR DILATED RETINAL EXAM W/O EVID OF RETINOPATHY: ICD-10-PCS | Mod: CPTII,S$GLB,, | Performed by: OPHTHALMOLOGY

## 2023-09-12 PROCEDURE — 92133 CPTRZD OPH DX IMG PST SGM ON: CPT | Mod: S$GLB,,, | Performed by: OPHTHALMOLOGY

## 2023-09-12 PROCEDURE — 1160F PR REVIEW ALL MEDS BY PRESCRIBER/CLIN PHARMACIST DOCUMENTED: ICD-10-PCS | Mod: CPTII,S$GLB,, | Performed by: OPHTHALMOLOGY

## 2023-09-12 PROCEDURE — 99999 PR PBB SHADOW E&M-EST. PATIENT-LVL III: ICD-10-PCS | Mod: PBBFAC,,, | Performed by: OPHTHALMOLOGY

## 2023-09-12 PROCEDURE — 99214 OFFICE O/P EST MOD 30 MIN: CPT | Mod: S$GLB,,, | Performed by: OPHTHALMOLOGY

## 2023-09-12 PROCEDURE — 1159F PR MEDICATION LIST DOCUMENTED IN MEDICAL RECORD: ICD-10-PCS | Mod: CPTII,S$GLB,, | Performed by: OPHTHALMOLOGY

## 2023-09-12 PROCEDURE — 1160F RVW MEDS BY RX/DR IN RCRD: CPT | Mod: CPTII,S$GLB,, | Performed by: OPHTHALMOLOGY

## 2023-09-12 RX ORDER — KETOROLAC TROMETHAMINE 5 MG/ML
1 SOLUTION OPHTHALMIC 4 TIMES DAILY
Qty: 5 ML | Refills: 0 | Status: SHIPPED | OUTPATIENT
Start: 2023-09-12 | End: 2023-09-17

## 2023-09-12 NOTE — PROGRESS NOTES
HPI     Glaucoma            Comments: Pt here for 4m IOP chk. No pain or discomfort. [facility] stable. 100%   compliant with gtts.           Comments    1. Stroke 6/2/2016   2. +GLAUCOMA   MINISHUNT OS BY MGM   3. +DM X 13 YEARS   4. PCIOL OD +19.5 SN60WF / CDE: 15:15 / 8-10-17   PCIOL OS +18.5 SN60WF / CDE: 17.58 / 7/27/2017   PCO OU     GCL 25/26--- 08/10/21   GCL 25/25 -- 12/2/2022      OU- BRIMONIDINE 0.2% TID , Cosopt BID , LATANOPROST QHS          Last edited by Wong Gr MA on 9/12/2023  2:35 PM.            Assessment /Plan     For exam results, see Encounter Report.      ICD-10-CM ICD-9-CM    1. Primary open angle glaucoma of both eyes, severe stage  H40.1133 365.11 Posterior Segment OCT Optic Nerve- Both eyes      IOP not within acceptable range relative to target IOP with risk of irreversible visual loss. Additional treatment required.  Discussed options, risks, and benefits of additional medication, SLT laser, or incisional glaucoma surgery.     Recommend: SLT OS    Patient chooses the above     Reviewed importance of continued compliance with treatment and follow up.     365.73       2. Cataract extraction status, right  Z98.41 V45.61 Monitor       3. Cataract extraction status, left  Z98.42 V45.61 Monitor       4. 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 Yag laser treatment as needed if visual loss occurs.           Return to clinic 4 months for IOP and GOCT      OU- Brimonidine 0.2% TID  OU- Cosopt BID  OU- Latanoprost qd

## 2023-09-26 ENCOUNTER — OUTSIDE PLACE OF SERVICE (OUTPATIENT)
Dept: OPHTHALMOLOGY | Facility: CLINIC | Age: 78
End: 2023-09-26
Payer: MEDICARE

## 2023-09-26 PROCEDURE — 65855 TRABECULOPLASTY LASER SURG: CPT | Mod: LT,,, | Performed by: OPHTHALMOLOGY

## 2023-09-26 PROCEDURE — 65855 PR TRABECULOPLASTY LASER SURGERY: ICD-10-PCS | Mod: LT,,, | Performed by: OPHTHALMOLOGY

## 2023-09-27 ENCOUNTER — OFFICE VISIT (OUTPATIENT)
Dept: OPHTHALMOLOGY | Facility: CLINIC | Age: 78
End: 2023-09-27
Payer: MEDICARE

## 2023-09-27 DIAGNOSIS — Z98.890 POST-OPERATIVE STATE: Primary | ICD-10-CM

## 2023-09-27 DIAGNOSIS — H40.1133 PRIMARY OPEN ANGLE GLAUCOMA OF BOTH EYES, SEVERE STAGE: ICD-10-CM

## 2023-09-27 PROCEDURE — 1159F PR MEDICATION LIST DOCUMENTED IN MEDICAL RECORD: ICD-10-PCS | Mod: CPTII,S$GLB,, | Performed by: OPHTHALMOLOGY

## 2023-09-27 PROCEDURE — 1159F MED LIST DOCD IN RCRD: CPT | Mod: CPTII,S$GLB,, | Performed by: OPHTHALMOLOGY

## 2023-09-27 PROCEDURE — 1160F RVW MEDS BY RX/DR IN RCRD: CPT | Mod: CPTII,S$GLB,, | Performed by: OPHTHALMOLOGY

## 2023-09-27 PROCEDURE — 99024 PR POST-OP FOLLOW-UP VISIT: ICD-10-PCS | Mod: S$GLB,,, | Performed by: OPHTHALMOLOGY

## 2023-09-27 PROCEDURE — 99999 PR PBB SHADOW E&M-EST. PATIENT-LVL III: CPT | Mod: PBBFAC,,, | Performed by: OPHTHALMOLOGY

## 2023-09-27 PROCEDURE — 1160F PR REVIEW ALL MEDS BY PRESCRIBER/CLIN PHARMACIST DOCUMENTED: ICD-10-PCS | Mod: CPTII,S$GLB,, | Performed by: OPHTHALMOLOGY

## 2023-09-27 PROCEDURE — 99999 PR PBB SHADOW E&M-EST. PATIENT-LVL III: ICD-10-PCS | Mod: PBBFAC,,, | Performed by: OPHTHALMOLOGY

## 2023-09-27 PROCEDURE — 99024 POSTOP FOLLOW-UP VISIT: CPT | Mod: S$GLB,,, | Performed by: OPHTHALMOLOGY

## 2023-09-27 NOTE — PROGRESS NOTES
HPI     Post-op Evaluation            Comments: Pt reports for 1 day SLT OS. Denies any pain or irritation. [facility]   stable. 100% compliant with gtts.           Comments    1. Stroke 6/2/2016   2. +GLAUCOMA   SLT OS 9/26/23  MINISHUNT OS BY MGM   3. +DM X 13 YEARS   4. PCIOL OD +19.5 SN60WF / CDE: 15:15 / 8-10-17   PCIOL OS +18.5 SN60WF / CDE: 17.58 / 7/27/2017   PCO OU     GCL 25/26 -- 08/10/21   GCL 25/25 -- 12/2/22  GCL 25/25 -- 09/12/23      OU- Brimonidine 0.2% TID  OU- Cosopt BID  OU- Latanoprost qd            Last edited by Saurav Ambrosio on 9/27/2023 12:59 PM.            Assessment /Plan     For exam results, see Encounter Report.      ICD-10-CM ICD-9-CM    1. Post-operative state  Z98.890 V45.89       2. Primary open angle glaucoma of both eyes, severe stage  H40.1133 365.11      365.73           S/P SLT left eye   Doing well  Ketorolac QID for 5 days operative eye  Continue glaucoma medications as ordered  RTC 4 weeks

## 2023-10-18 ENCOUNTER — OFFICE VISIT (OUTPATIENT)
Dept: OPHTHALMOLOGY | Facility: CLINIC | Age: 78
End: 2023-10-18
Payer: MEDICARE

## 2023-10-18 DIAGNOSIS — H40.1133 PRIMARY OPEN ANGLE GLAUCOMA OF BOTH EYES, SEVERE STAGE: Primary | ICD-10-CM

## 2023-10-18 DIAGNOSIS — Z98.42 CATARACT EXTRACTION STATUS, LEFT: ICD-10-CM

## 2023-10-18 DIAGNOSIS — Z98.41 CATARACT EXTRACTION STATUS, RIGHT: ICD-10-CM

## 2023-10-18 PROCEDURE — 99214 PR OFFICE/OUTPT VISIT, EST, LEVL IV, 30-39 MIN: ICD-10-PCS | Mod: S$GLB,,, | Performed by: OPHTHALMOLOGY

## 2023-10-18 PROCEDURE — 1126F PR PAIN SEVERITY QUANTIFIED, NO PAIN PRESENT: ICD-10-PCS | Mod: CPTII,S$GLB,, | Performed by: OPHTHALMOLOGY

## 2023-10-18 PROCEDURE — 1159F PR MEDICATION LIST DOCUMENTED IN MEDICAL RECORD: ICD-10-PCS | Mod: CPTII,S$GLB,, | Performed by: OPHTHALMOLOGY

## 2023-10-18 PROCEDURE — 99999 PR PBB SHADOW E&M-EST. PATIENT-LVL III: CPT | Mod: PBBFAC,,, | Performed by: OPHTHALMOLOGY

## 2023-10-18 PROCEDURE — 99214 OFFICE O/P EST MOD 30 MIN: CPT | Mod: S$GLB,,, | Performed by: OPHTHALMOLOGY

## 2023-10-18 PROCEDURE — 1126F AMNT PAIN NOTED NONE PRSNT: CPT | Mod: CPTII,S$GLB,, | Performed by: OPHTHALMOLOGY

## 2023-10-18 PROCEDURE — 1160F PR REVIEW ALL MEDS BY PRESCRIBER/CLIN PHARMACIST DOCUMENTED: ICD-10-PCS | Mod: CPTII,S$GLB,, | Performed by: OPHTHALMOLOGY

## 2023-10-18 PROCEDURE — 1160F RVW MEDS BY RX/DR IN RCRD: CPT | Mod: CPTII,S$GLB,, | Performed by: OPHTHALMOLOGY

## 2023-10-18 PROCEDURE — 1159F MED LIST DOCD IN RCRD: CPT | Mod: CPTII,S$GLB,, | Performed by: OPHTHALMOLOGY

## 2023-10-18 PROCEDURE — 99999 PR PBB SHADOW E&M-EST. PATIENT-LVL III: ICD-10-PCS | Mod: PBBFAC,,, | Performed by: OPHTHALMOLOGY

## 2023-10-18 NOTE — PROGRESS NOTES
HPI    Post SLT OS. He didn't use his drops this morning but has been compliant.      1. Stroke 6/2/2016   2. +GLAUCOMA   SLT OS 9/26/23  MINISHUNT OS BY MGM   3. +DM X 13 YEARS   4. PCIOL OD +19.5 SN60WF / CDE: 15:15 / 8-10-17   PCIOL OS +18.5 SN60WF / CDE: 17.58 / 7/27/2017   PCO OU     GCL 25/26 -- 08/10/21   GCL 25/25 -- 12/2/22  GCL 25/25 -- 09/12/23      OU- Brimonidine 0.2% TID  OU- Cosopt BID  OU- Latanoprost qd  Last edited by Vishnu Chahal MA on 10/18/2023 10:27 AM.            Assessment /Plan     For exam results, see Encounter Report.      ICD-10-CM ICD-9-CM    1. Primary open angle glaucoma of both eyes, severe stage  H40.1133 365.11 SLT OS 9/26/23    Pt missed meds this am     Resume meds      365.73       2. Cataract extraction status, right  Z98.41 V45.61 Doing well       3. Cataract extraction status, left  Z98.42 V45.61 Doing well           RETURN TO CLINIC 2 month IOP       OU- Brimonidine 0.2% TID  OU- Cosopt BID  OU- Latanoprost qd

## 2023-12-19 ENCOUNTER — OFFICE VISIT (OUTPATIENT)
Dept: OPHTHALMOLOGY | Facility: CLINIC | Age: 78
End: 2023-12-19
Payer: MEDICARE

## 2023-12-19 DIAGNOSIS — Z98.41 CATARACT EXTRACTION STATUS, RIGHT: ICD-10-CM

## 2023-12-19 DIAGNOSIS — Z98.42 CATARACT EXTRACTION STATUS, LEFT: ICD-10-CM

## 2023-12-19 DIAGNOSIS — H04.123 DRY EYE SYNDROME, BILATERAL: ICD-10-CM

## 2023-12-19 DIAGNOSIS — H40.1133 PRIMARY OPEN ANGLE GLAUCOMA OF BOTH EYES, SEVERE STAGE: Primary | ICD-10-CM

## 2023-12-19 PROCEDURE — 99999 PR PBB SHADOW E&M-EST. PATIENT-LVL I: CPT | Mod: PBBFAC,,, | Performed by: OPHTHALMOLOGY

## 2023-12-19 PROCEDURE — 99214 OFFICE O/P EST MOD 30 MIN: CPT | Mod: S$GLB,,, | Performed by: OPHTHALMOLOGY

## 2023-12-19 PROCEDURE — 99999 PR PBB SHADOW E&M-EST. PATIENT-LVL I: ICD-10-PCS | Mod: PBBFAC,,, | Performed by: OPHTHALMOLOGY

## 2023-12-19 PROCEDURE — 99214 PR OFFICE/OUTPT VISIT, EST, LEVL IV, 30-39 MIN: ICD-10-PCS | Mod: S$GLB,,, | Performed by: OPHTHALMOLOGY

## 2023-12-19 NOTE — PROGRESS NOTES
HPI    1. Stroke 6/2/2016   2. +GLAUCOMA   SLT OS 9/26/23  MINISHUNT OS BY MGM   3. +DM X 13 YEARS   4. PCIOL OD +19.5 SN60WF / CDE: 15:15 / 8-10-17   PCIOL OS +18.5 SN60WF / CDE: 17.58 / 7/27/2017   PCO OU     GCL 25/26 -- 08/10/21   GCL 25/25 -- 12/2/22  GCL 25/25 -- 09/12/23      OU- Brimonidine 0.2% TID  OU- Cosopt BID  OU- Latanoprost qd    Last edited by Saurav Kauffman MD on 12/19/2023 10:28 AM.            Assessment /Plan     For exam results, see Encounter Report.      ICD-10-CM ICD-9-CM    1. Primary open angle glaucoma of both eyes, severe stage  H40.1133 365.11 INCREASED IOP TODAY SLIGHTLY- PT MISSED MEDS THIS AM     RESUME MEDS      365.73       2. Cataract extraction status, right  Z98.41 V45.61 DOING WELL       3. Cataract extraction status, left  Z98.42 V45.61 DOING WELL       4. Dry eye syndrome, bilateral  H04.123 375.15 FOLLOW           Return to clinic 3 MONTH IOP   OU- Brimonidine 0.2% TID  OU- Cosopt BID  OU- Latanoprost qd

## 2024-03-19 ENCOUNTER — OFFICE VISIT (OUTPATIENT)
Dept: OPHTHALMOLOGY | Facility: CLINIC | Age: 79
End: 2024-03-19
Payer: MEDICARE

## 2024-03-19 DIAGNOSIS — Z98.42 CATARACT EXTRACTION STATUS, LEFT: ICD-10-CM

## 2024-03-19 DIAGNOSIS — Z98.41 CATARACT EXTRACTION STATUS, RIGHT: ICD-10-CM

## 2024-03-19 DIAGNOSIS — H40.1133 PRIMARY OPEN ANGLE GLAUCOMA OF BOTH EYES, SEVERE STAGE: Primary | ICD-10-CM

## 2024-03-19 DIAGNOSIS — H04.123 DRY EYE SYNDROME, BILATERAL: ICD-10-CM

## 2024-03-19 PROCEDURE — 1160F RVW MEDS BY RX/DR IN RCRD: CPT | Mod: CPTII,S$GLB,, | Performed by: OPHTHALMOLOGY

## 2024-03-19 PROCEDURE — 1159F MED LIST DOCD IN RCRD: CPT | Mod: CPTII,S$GLB,, | Performed by: OPHTHALMOLOGY

## 2024-03-19 PROCEDURE — 99999 PR PBB SHADOW E&M-EST. PATIENT-LVL III: CPT | Mod: PBBFAC,,, | Performed by: OPHTHALMOLOGY

## 2024-03-19 PROCEDURE — 99214 OFFICE O/P EST MOD 30 MIN: CPT | Mod: S$GLB,,, | Performed by: OPHTHALMOLOGY

## 2024-03-19 NOTE — PROGRESS NOTES
HPI    3 months glaucoma check (IOP check)    No eye pain and no new eye changes since patient's last visit    1. Stroke 6/2/2016   2. +GLAUCOMA   SLT OS 9/26/23  MINISHUNT OS BY MGM   3. +DM X 13 YEARS   4. PCIOL OD +19.5 SN60WF / CDE: 15:15 / 8-10-17   PCIOL OS +18.5 SN60WF / CDE: 17.58 / 7/27/2017   PCO OU     GCL 25/26 -- 08/10/21   GCL 25/25 -- 12/2/22  GCL 25/25 -- 09/12/23      OU- Brimonidine 0.2% TID  OU- Cosopt BID  OU- Latanoprost qd     Last edited by Nikkie Chi MA on 3/19/2024 10:23 AM.            Assessment /Plan     For exam results, see Encounter Report.      ICD-10-CM ICD-9-CM    1. Primary open angle glaucoma of both eyes, severe stage  H40.1133 365.11 mammogram       365.73       2. Cataract extraction status, left  Z98.42 V45.61       3. Cataract extraction status, right  Z98.41 V45.61       4. Dry eye syndrome, bilateral  H04.123 375.15           Return to clinic 3 months for IOP        OU- Brimonidine 0.2% TID  OU- Cosopt BID  OU- Latanoprost qd

## 2024-06-19 ENCOUNTER — OFFICE VISIT (OUTPATIENT)
Dept: OPHTHALMOLOGY | Facility: CLINIC | Age: 79
End: 2024-06-19
Payer: MEDICARE

## 2024-06-19 DIAGNOSIS — H40.1133 PRIMARY OPEN ANGLE GLAUCOMA OF BOTH EYES, SEVERE STAGE: Primary | ICD-10-CM

## 2024-06-19 DIAGNOSIS — Z98.41 CATARACT EXTRACTION STATUS, RIGHT: ICD-10-CM

## 2024-06-19 DIAGNOSIS — Z98.42 CATARACT EXTRACTION STATUS, LEFT: ICD-10-CM

## 2024-06-19 PROCEDURE — 1159F MED LIST DOCD IN RCRD: CPT | Mod: CPTII,S$GLB,, | Performed by: OPHTHALMOLOGY

## 2024-06-19 PROCEDURE — 99214 OFFICE O/P EST MOD 30 MIN: CPT | Mod: S$GLB,,, | Performed by: OPHTHALMOLOGY

## 2024-06-19 PROCEDURE — 99999 PR PBB SHADOW E&M-EST. PATIENT-LVL III: CPT | Mod: PBBFAC,,, | Performed by: OPHTHALMOLOGY

## 2024-06-19 NOTE — PROGRESS NOTES
HPI     Glaucoma            Comments: Pt here for 3m IOP check. No pain or discomfort. [facility] stable.   100% compliant with gtts.           Comments    1. Stroke 6/2/2016   2. +GLAUCOMA   SLT OS 9/26/23  MINISHUNT OS BY MGM   3. +DM X 13 YEARS   4. PCIOL OD +19.5 SN60WF / CDE: 15:15 / 8-10-17   PCIOL OS +18.5 SN60WF / CDE: 17.58 / 7/27/2017   PCO OU     GCL 25/26 -- 08/10/21   GCL 25/25 -- 12/2/22  GCL 25/25 -- 09/12/23      OU- Brimonidine 0.2% TID  OU- Cosopt BID  OU- Latanoprost qd             Last edited by Wong Gr MA on 6/19/2024  9:38 AM.            Assessment /Plan     For exam results, see Encounter Report.      ICD-10-CM ICD-9-CM    1. Primary open angle glaucoma of both eyes, severe stage  H40.1133 365.11 Doing well - intraocular pressure is within acceptable range relative to target pressure with no evidence of progression.   Continue current treatment.  Reviewed importance of continued compliance with treatment and follow up.        365.73       2. Cataract extraction status, left  Z98.42 V45.61 Doing well       3. Cataract extraction status, right  Z98.41 V45.61 Doing well           OU- Brimonidine 0.2% TID  OU- Cosopt BID  OU- Latanoprost qd     RETURN TO CLINIC 3 months DOA, goct

## 2024-10-23 ENCOUNTER — OFFICE VISIT (OUTPATIENT)
Dept: OPHTHALMOLOGY | Facility: CLINIC | Age: 79
End: 2024-10-23
Payer: MEDICARE

## 2024-10-23 DIAGNOSIS — H04.123 DRY EYE SYNDROME, BILATERAL: ICD-10-CM

## 2024-10-23 DIAGNOSIS — H40.1133 PRIMARY OPEN ANGLE GLAUCOMA OF BOTH EYES, SEVERE STAGE: Primary | ICD-10-CM

## 2024-10-23 DIAGNOSIS — Z96.1 PSEUDOPHAKIA OF BOTH EYES: ICD-10-CM

## 2024-10-23 DIAGNOSIS — E11.42 DM TYPE 2 WITH DIABETIC PERIPHERAL NEUROPATHY: ICD-10-CM

## 2024-10-23 PROCEDURE — 1159F MED LIST DOCD IN RCRD: CPT | Mod: CPTII,S$GLB,, | Performed by: OPHTHALMOLOGY

## 2024-10-23 PROCEDURE — 99999 PR PBB SHADOW E&M-EST. PATIENT-LVL III: CPT | Mod: PBBFAC,,, | Performed by: OPHTHALMOLOGY

## 2024-10-23 PROCEDURE — 92133 CPTRZD OPH DX IMG PST SGM ON: CPT | Mod: S$GLB,,, | Performed by: OPHTHALMOLOGY

## 2024-10-23 PROCEDURE — 99214 OFFICE O/P EST MOD 30 MIN: CPT | Mod: S$GLB,,, | Performed by: OPHTHALMOLOGY

## 2024-10-23 PROCEDURE — 2023F DILAT RTA XM W/O RTNOPTHY: CPT | Mod: CPTII,S$GLB,, | Performed by: OPHTHALMOLOGY

## 2024-10-23 RX ORDER — NETARSUDIL AND LATANOPROST OPHTHALMIC SOLUTION, 0.02%/0.005% .2; .05 MG/ML; MG/ML
1 SOLUTION/ DROPS OPHTHALMIC; TOPICAL DAILY
Qty: 7.5 ML | Refills: 3 | Status: SHIPPED | OUTPATIENT
Start: 2024-10-23 | End: 2024-10-23

## 2024-10-23 RX ORDER — NETARSUDIL AND LATANOPROST OPHTHALMIC SOLUTION, 0.02%/0.005% .2; .05 MG/ML; MG/ML
1 SOLUTION/ DROPS OPHTHALMIC; TOPICAL DAILY
Qty: 7.5 ML | Refills: 3 | Status: SHIPPED | OUTPATIENT
Start: 2024-10-23

## 2024-10-23 NOTE — PROGRESS NOTES
HPI     Glaucoma            Comments: Pt reports for 3m GOCT dil. Denies any pain or irritation. Va   stable. Using drops as directed.           Comments    1. Stroke 6/2/2016   2. +GLAUCOMA   SLT OS 9/26/23  MINISHUNT OS BY MGM   3. +DM X 13 YEARS   4. PCIOL OD +19.5 SN60WF / CDE: 15:15 / 8-10-17   PCIOL OS +18.5 SN60WF / CDE: 17.58 / 7/27/2017   PCO OU       Drop list:  Brimonidine (Alphagan) 3xs daily -- both eyes   Cosopt 2xs daily -- both eyes   Latanoprost 1x daily -- both eyes             Last edited by Saurav Ambrosio on 10/23/2024  1:16 PM.            Assessment /Plan     For exam results, see Encounter Report.      ICD-10-CM ICD-9-CM    1. Primary open angle glaucoma of both eyes, severe stage  H40.1133 365.11 Posterior Segment OCT Optic Nerve- Both eyes based on left eye oct would like IOP lower - trial Rocklatan and stop latanoprost       365.73       2. DM type 2 with diabetic peripheral neuropathy  E11.42 250.60 Diabetes with no diabetic retinopathy on dilated exam.   Reviewed diabetic eye precautions including excellent blood sugar control, and importance of regular follow up.          357.2       3. Pseudophakia of both eyes  Z96.1 V43.1 Doing well       4. Dry eye syndrome, bilateral  H04.123 375.15           RETURN TO CLINIC 2 month IOP   Drop list:  Brimonidine (Alphagan) 3xs daily -- both eyes   Cosopt 2xs daily -- both eyes   Rocklatan 1x daily -- both eyes

## 2024-12-17 ENCOUNTER — OFFICE VISIT (OUTPATIENT)
Dept: OPHTHALMOLOGY | Facility: CLINIC | Age: 79
End: 2024-12-17
Payer: MEDICARE

## 2024-12-17 DIAGNOSIS — H40.1133 PRIMARY OPEN ANGLE GLAUCOMA OF BOTH EYES, SEVERE STAGE: Primary | ICD-10-CM

## 2024-12-17 DIAGNOSIS — E11.42 DM TYPE 2 WITH DIABETIC PERIPHERAL NEUROPATHY: ICD-10-CM

## 2024-12-17 DIAGNOSIS — Z96.1 PSEUDOPHAKIA OF BOTH EYES: ICD-10-CM

## 2024-12-17 DIAGNOSIS — H04.123 DRY EYE SYNDROME, BILATERAL: ICD-10-CM

## 2024-12-17 PROCEDURE — 99999 PR PBB SHADOW E&M-EST. PATIENT-LVL III: CPT | Mod: PBBFAC,,, | Performed by: OPHTHALMOLOGY

## 2024-12-17 PROCEDURE — 1159F MED LIST DOCD IN RCRD: CPT | Mod: CPTII,S$GLB,, | Performed by: OPHTHALMOLOGY

## 2024-12-17 PROCEDURE — 1160F RVW MEDS BY RX/DR IN RCRD: CPT | Mod: CPTII,S$GLB,, | Performed by: OPHTHALMOLOGY

## 2024-12-17 PROCEDURE — G2211 COMPLEX E/M VISIT ADD ON: HCPCS | Mod: S$GLB,,, | Performed by: OPHTHALMOLOGY

## 2024-12-17 PROCEDURE — 99214 OFFICE O/P EST MOD 30 MIN: CPT | Mod: S$GLB,,, | Performed by: OPHTHALMOLOGY

## 2024-12-17 NOTE — PROGRESS NOTES
HPI     Glaucoma            Comments: 2 months IOP check... Patient states no visual complaints and   no ocular pain/discomfort. Pt was unable to tolerate rocklatan, caused   redness and blurred vision           Comments    1. Stroke 6/2/2016   2. +GLAUCOMA   SLT OS 9/26/23  MINISHUNT OS BY MGM   3. +DM X 13 YEARS   4. PCIOL OD +19.5 SN60WF / CDE: 15:15 / 8-10-17   PCIOL OS +18.5 SN60WF / CDE: 17.58 / 7/27/2017   PCO OU       Drop list:  Brimonidine (Alphagan) 3xs daily -- both eyes   Cosopt 2xs daily -- both eyes   Latanoprost 1x daily -- both eyes           Last edited by Saurav Kauffman MD on 12/17/2024  4:04 PM.            Assessment /Plan     For exam results, see Encounter Report.      ICD-10-CM ICD-9-CM    1. Primary open angle glaucoma of both eyes, severe stage  H40.1133 365.11 Doing well - intraocular pressure is within acceptable range relative to target pressure with no evidence of progression.   Continue current treatment.  Reviewed importance of continued compliance with treatment and follow up.       Visit today included increased complexity associated with the care and management of glaucoma and Diabetes. Patient aware that management of medical condition requires long term follow up.  Written instructions were placed in the portal for the patient upon closure of the encounter regarding specific use of the required medications.         365.73       2. DM type 2 with diabetic peripheral neuropathy  E11.42 250.60 Lab Results   Component Value Date    HGBA1C 8.9 (H) 09/09/2024          357.2       3. Pseudophakia of both eyes  Z96.1 V43.1       4. Dry eye syndrome, bilateral  H04.123 375.15 Continue tears         Return to clinic 3 months IOP     Drop list:  Brimonidine (Alphagan) 3xs daily -- both eyes   Cosopt 2xs daily -- both eyes   Latanoprost 1x daily -- both eyes

## 2025-03-18 ENCOUNTER — OFFICE VISIT (OUTPATIENT)
Dept: OPHTHALMOLOGY | Facility: CLINIC | Age: 80
End: 2025-03-18
Payer: MEDICARE

## 2025-03-18 DIAGNOSIS — H40.1133 PRIMARY OPEN ANGLE GLAUCOMA OF BOTH EYES, SEVERE STAGE: Primary | ICD-10-CM

## 2025-03-18 DIAGNOSIS — Z96.1 PSEUDOPHAKIA OF BOTH EYES: ICD-10-CM

## 2025-03-18 DIAGNOSIS — H04.123 DRY EYE SYNDROME, BILATERAL: ICD-10-CM

## 2025-03-18 PROCEDURE — 99214 OFFICE O/P EST MOD 30 MIN: CPT | Mod: S$GLB,,, | Performed by: OPHTHALMOLOGY

## 2025-03-18 PROCEDURE — 1160F RVW MEDS BY RX/DR IN RCRD: CPT | Mod: CPTII,S$GLB,, | Performed by: OPHTHALMOLOGY

## 2025-03-18 PROCEDURE — G2211 COMPLEX E/M VISIT ADD ON: HCPCS | Mod: S$GLB,,, | Performed by: OPHTHALMOLOGY

## 2025-03-18 PROCEDURE — 99999 PR PBB SHADOW E&M-EST. PATIENT-LVL III: CPT | Mod: PBBFAC,,, | Performed by: OPHTHALMOLOGY

## 2025-03-18 PROCEDURE — 1159F MED LIST DOCD IN RCRD: CPT | Mod: CPTII,S$GLB,, | Performed by: OPHTHALMOLOGY

## 2025-03-18 NOTE — PROGRESS NOTES
HPI     Glaucoma            Comments: Pt reports for 3m IOP check. Denies any pain or irritation. Va   stable. Using drops as directed.           Comments    1. Stroke 6/2/2016   2. +GLAUCOMA   SLT OS 9/26/23  MINISHUNT OS BY MGM   3. +DM X 13 YEARS   4. PCIOL OD +19.5 SN60WF / CDE: 15:15 / 8-10-17   PCIOL OS +18.5 SN60WF / CDE: 17.58 / 7/27/2017   PCO OU     Intolerant of Rocklatan - redness and blurred vision    Drop list:  Brimonidine (Alphagan) 3xs daily -- both eyes   Cosopt 2xs daily -- both eyes   Latanoprost 1x daily -- both eyes             Last edited by Saurav Ambrosio on 3/18/2025 12:55 PM.            Assessment /Plan     For exam results, see Encounter Report.      ICD-10-CM ICD-9-CM    1. Primary open angle glaucoma of both eyes, severe stage  H40.1133 365.11 Iop higher today - will recheck in about one month and if still elevated consider Slt OU    Visit today included increased complexity associated with the care and management of glaucoma. Patient aware that management of medical condition requires long term follow up.  Written instructions were placed in the portal for the patient upon closure of the encounter regarding specific use of the required medications.           365.73       2. Pseudophakia of both eyes  Z96.1 V43.1       3. Dry eye syndrome, bilateral  H04.123 375.15 Continue tears         4.   Diabetes      Lab Results   Component Value Date    HGBA1C 8.9 (H) 09/09/2024       Drop list:  Brimonidine (Alphagan) 3xs daily -- both eyes   Cosopt 2xs daily -- both eyes   Latanoprost 1x daily -- both eyes     Return to clinic  4-8 weeks for dilation.

## 2025-05-06 ENCOUNTER — OFFICE VISIT (OUTPATIENT)
Dept: OPHTHALMOLOGY | Facility: CLINIC | Age: 80
End: 2025-05-06
Payer: MEDICARE

## 2025-05-06 DIAGNOSIS — H40.1133 PRIMARY OPEN ANGLE GLAUCOMA OF BOTH EYES, SEVERE STAGE: Primary | ICD-10-CM

## 2025-05-06 DIAGNOSIS — Z96.1 PSEUDOPHAKIA OF BOTH EYES: ICD-10-CM

## 2025-05-06 DIAGNOSIS — E11.42 DM TYPE 2 WITH DIABETIC PERIPHERAL NEUROPATHY: ICD-10-CM

## 2025-05-06 DIAGNOSIS — H26.493 PCO (POSTERIOR CAPSULAR OPACIFICATION), BILATERAL: ICD-10-CM

## 2025-05-06 DIAGNOSIS — H04.123 DRY EYE SYNDROME, BILATERAL: ICD-10-CM

## 2025-05-06 PROCEDURE — 1159F MED LIST DOCD IN RCRD: CPT | Mod: CPTII,S$GLB,, | Performed by: OPHTHALMOLOGY

## 2025-05-06 PROCEDURE — 99999 PR PBB SHADOW E&M-EST. PATIENT-LVL III: CPT | Mod: PBBFAC,,, | Performed by: OPHTHALMOLOGY

## 2025-05-06 PROCEDURE — 1160F RVW MEDS BY RX/DR IN RCRD: CPT | Mod: CPTII,S$GLB,, | Performed by: OPHTHALMOLOGY

## 2025-05-06 PROCEDURE — G2211 COMPLEX E/M VISIT ADD ON: HCPCS | Mod: S$GLB,,, | Performed by: OPHTHALMOLOGY

## 2025-05-06 PROCEDURE — 99214 OFFICE O/P EST MOD 30 MIN: CPT | Mod: S$GLB,,, | Performed by: OPHTHALMOLOGY

## 2025-05-06 NOTE — PROGRESS NOTES
HPI     Glaucoma            Comments: Pt reports for 4-8wk dil. Denies any pain or irritation. Va   stable. Using drops as directed.           Comments    1. Stroke 6/2/2016   2. +GLAUCOMA   SLT OS 9/26/23  MINISHUNT OS BY [facility]   3. +DM X 13 YEARS   4. PCIOL OD +19.5 SN60WF / CDE: 15:15 / 8-10-17   PCIOL OS +18.5 SN60WF / CDE: 17.58 / 7/27/2017   PCO OU     Intolerant of Rocklat[place] - redness and blurred vision    Drop list:  Brimonidine (Alphagan) 3xs daily -- both eyes   Cosopt 2xs daily -- both eyes   Latanoprost 1x daily -- both eyes             Last edited by Saurav Ambrosio on 5/6/2025  1:04 PM.            Assessment /Plan     For exam results, see Encounter Report.      ICD-10-CM ICD-9-CM    1. Primary open angle glaucoma of both eyes, severe stage  H40.1133 365.11 Doing well - intraocular pressure is within acceptable range relative to target pressure with no evidence of progression.   Continue current treatment.  Reviewed importance of continued compliance with treatment and follow up.   Visit today included increased complexity associated with the care and management of glaucoma, dry eye , and Diabetes. Patient aware that management of medical condition requires long term follow up.  Written instructions were placed in the portal for the patient upon closure of the encounter regarding specific use of the required medications.         365.73       2. DM type 2 with diabetic peripheral neuropathy  E11.42 250.60 Diabetes with no diabetic retinopathy on dilated exam.   Reviewed diabetic eye precautions including excellent blood sugar control, and importance of regular follow up.          357.2       3. PCO (posterior capsular opacification), bilateral  H26.493 366.50 Mild, follow at this time       4. Pseudophakia of both eyes  Z96.1 V43.1 Stable       5. Dry eye syndrome, bilateral  H04.123 375.15 Use tears           Return to clinic 4 months IOP and GOCT     Drop list:  Brimonidine (Alphagan) 3xs daily -- both  eyes   Cosopt 2xs daily -- both eyes   Latanoprost 1x daily -- both eyes